# Patient Record
Sex: FEMALE | Race: WHITE | Employment: OTHER
[De-identification: names, ages, dates, MRNs, and addresses within clinical notes are randomized per-mention and may not be internally consistent; named-entity substitution may affect disease eponyms.]

---

## 2017-02-06 ENCOUNTER — HOSPITAL ENCOUNTER (OUTPATIENT)
Dept: PHYSICAL THERAPY | Facility: CLINIC | Age: 82
Setting detail: THERAPIES SERIES
Discharge: HOME | End: 2017-02-06
Payer: MEDICARE

## 2017-02-06 PROCEDURE — 97110 THERAPEUTIC EXERCISES: CPT

## 2017-02-06 PROCEDURE — 97035 APP MDLTY 1+ULTRASOUND EA 15: CPT

## 2017-02-06 PROCEDURE — 97140 MANUAL THERAPY 1/> REGIONS: CPT

## 2017-02-08 ENCOUNTER — HOSPITAL ENCOUNTER (OUTPATIENT)
Dept: PHYSICAL THERAPY | Facility: CLINIC | Age: 82
Setting detail: THERAPIES SERIES
Discharge: HOME | End: 2017-02-08
Payer: MEDICARE

## 2017-02-08 PROCEDURE — 97140 MANUAL THERAPY 1/> REGIONS: CPT

## 2017-02-08 PROCEDURE — 97110 THERAPEUTIC EXERCISES: CPT

## 2017-02-08 PROCEDURE — 97035 APP MDLTY 1+ULTRASOUND EA 15: CPT

## 2017-02-13 ENCOUNTER — HOSPITAL ENCOUNTER (OUTPATIENT)
Dept: PHYSICAL THERAPY | Facility: CLINIC | Age: 82
Setting detail: THERAPIES SERIES
Discharge: HOME | End: 2017-02-13
Payer: MEDICARE

## 2017-02-13 PROCEDURE — 97140 MANUAL THERAPY 1/> REGIONS: CPT

## 2017-02-13 PROCEDURE — 97110 THERAPEUTIC EXERCISES: CPT

## 2017-02-13 PROCEDURE — 97035 APP MDLTY 1+ULTRASOUND EA 15: CPT

## 2017-02-16 ENCOUNTER — HOSPITAL ENCOUNTER (OUTPATIENT)
Dept: PHYSICAL THERAPY | Facility: CLINIC | Age: 82
Setting detail: THERAPIES SERIES
Discharge: HOME | End: 2017-02-16
Payer: MEDICARE

## 2017-02-16 PROCEDURE — 97140 MANUAL THERAPY 1/> REGIONS: CPT

## 2017-02-16 PROCEDURE — 97110 THERAPEUTIC EXERCISES: CPT

## 2017-02-16 PROCEDURE — 97035 APP MDLTY 1+ULTRASOUND EA 15: CPT

## 2017-02-20 ENCOUNTER — HOSPITAL ENCOUNTER (OUTPATIENT)
Dept: PHYSICAL THERAPY | Facility: CLINIC | Age: 82
Setting detail: THERAPIES SERIES
Discharge: HOME | End: 2017-02-20
Payer: MEDICARE

## 2017-02-20 PROCEDURE — 97140 MANUAL THERAPY 1/> REGIONS: CPT

## 2017-02-20 PROCEDURE — 97035 APP MDLTY 1+ULTRASOUND EA 15: CPT

## 2017-02-20 PROCEDURE — 97110 THERAPEUTIC EXERCISES: CPT

## 2017-02-27 ENCOUNTER — HOSPITAL ENCOUNTER (OUTPATIENT)
Dept: PHYSICAL THERAPY | Facility: CLINIC | Age: 82
Setting detail: THERAPIES SERIES
Discharge: HOME | End: 2017-02-27
Payer: MEDICARE

## 2017-02-27 PROCEDURE — 97035 APP MDLTY 1+ULTRASOUND EA 15: CPT

## 2017-02-27 PROCEDURE — 97140 MANUAL THERAPY 1/> REGIONS: CPT

## 2017-02-27 PROCEDURE — 97110 THERAPEUTIC EXERCISES: CPT

## 2017-03-06 ENCOUNTER — HOSPITAL ENCOUNTER (OUTPATIENT)
Dept: PHYSICAL THERAPY | Facility: CLINIC | Age: 82
Setting detail: THERAPIES SERIES
Discharge: HOME | End: 2017-03-06
Payer: MEDICARE

## 2017-03-06 PROCEDURE — 97035 APP MDLTY 1+ULTRASOUND EA 15: CPT

## 2017-03-06 PROCEDURE — 97110 THERAPEUTIC EXERCISES: CPT

## 2017-03-06 PROCEDURE — 97140 MANUAL THERAPY 1/> REGIONS: CPT

## 2017-03-22 ENCOUNTER — HOSPITAL ENCOUNTER (OUTPATIENT)
Dept: PHYSICAL THERAPY | Facility: CLINIC | Age: 82
Setting detail: THERAPIES SERIES
Discharge: HOME | End: 2017-03-22
Payer: MEDICARE

## 2017-03-22 PROCEDURE — 97140 MANUAL THERAPY 1/> REGIONS: CPT

## 2017-03-22 PROCEDURE — 97035 APP MDLTY 1+ULTRASOUND EA 15: CPT

## 2017-03-22 PROCEDURE — 97110 THERAPEUTIC EXERCISES: CPT

## 2017-10-17 ENCOUNTER — HOSPITAL ENCOUNTER (OUTPATIENT)
Dept: PHYSICAL THERAPY | Facility: CLINIC | Age: 82
Setting detail: THERAPIES SERIES
Discharge: HOME | End: 2017-10-17
Payer: MEDICARE

## 2017-10-17 PROCEDURE — 97140 MANUAL THERAPY 1/> REGIONS: CPT

## 2017-10-17 PROCEDURE — G8978 MOBILITY CURRENT STATUS: HCPCS

## 2017-10-17 PROCEDURE — 97035 APP MDLTY 1+ULTRASOUND EA 15: CPT

## 2017-10-17 PROCEDURE — G8979 MOBILITY GOAL STATUS: HCPCS

## 2017-10-17 PROCEDURE — 97161 PT EVAL LOW COMPLEX 20 MIN: CPT

## 2017-10-19 ENCOUNTER — HOSPITAL ENCOUNTER (OUTPATIENT)
Dept: PHYSICAL THERAPY | Facility: CLINIC | Age: 82
Setting detail: THERAPIES SERIES
Discharge: HOME | End: 2017-10-19
Payer: MEDICARE

## 2017-10-19 PROCEDURE — 97140 MANUAL THERAPY 1/> REGIONS: CPT

## 2017-10-19 PROCEDURE — 97110 THERAPEUTIC EXERCISES: CPT

## 2017-10-19 PROCEDURE — 97035 APP MDLTY 1+ULTRASOUND EA 15: CPT

## 2017-10-23 ENCOUNTER — HOSPITAL ENCOUNTER (OUTPATIENT)
Dept: PHYSICAL THERAPY | Facility: CLINIC | Age: 82
Setting detail: THERAPIES SERIES
Discharge: HOME | End: 2017-10-23
Payer: MEDICARE

## 2017-10-23 PROCEDURE — 97140 MANUAL THERAPY 1/> REGIONS: CPT

## 2017-10-23 PROCEDURE — 97110 THERAPEUTIC EXERCISES: CPT

## 2017-10-23 PROCEDURE — 97035 APP MDLTY 1+ULTRASOUND EA 15: CPT

## 2017-10-26 ENCOUNTER — HOSPITAL ENCOUNTER (OUTPATIENT)
Dept: PHYSICAL THERAPY | Facility: CLINIC | Age: 82
Setting detail: THERAPIES SERIES
Discharge: HOME | End: 2017-10-26
Payer: MEDICARE

## 2017-10-26 PROCEDURE — 97140 MANUAL THERAPY 1/> REGIONS: CPT

## 2017-10-26 PROCEDURE — 97110 THERAPEUTIC EXERCISES: CPT

## 2017-10-26 PROCEDURE — 97035 APP MDLTY 1+ULTRASOUND EA 15: CPT

## 2017-11-01 ENCOUNTER — HOSPITAL ENCOUNTER (OUTPATIENT)
Dept: PHYSICAL THERAPY | Facility: CLINIC | Age: 82
Setting detail: THERAPIES SERIES
Discharge: HOME | End: 2017-11-01
Payer: MEDICARE

## 2017-11-01 PROCEDURE — 97140 MANUAL THERAPY 1/> REGIONS: CPT

## 2017-11-01 PROCEDURE — 97035 APP MDLTY 1+ULTRASOUND EA 15: CPT

## 2017-11-01 PROCEDURE — 97110 THERAPEUTIC EXERCISES: CPT

## 2017-11-05 NOTE — FLOWSHEET NOTE
[] Lucero Onofre       Outpatient Physical        Therapy       [address]       Phone: [phone]       Fax: [phone] [] [facility] at [address] [facility]       Phone: [phone]       Fax: [phone] [x] Casie Peres [facility]  [address] [facility]   Phone: [phone]   Fax:  [phone]     Physical Therapy Daily Treatment Note    Date:  10/23/2017  Patient Name:  Rose Duncan    :  1935  MRN: [id number]  Physician: Dr. Anton Sebastian: Markus Diagnosis: Low back pain                 Rehab Codes: M54.5, M99.04  Onset Date: 10/16/17(script)                                  Next 's appt: PRN    Visit# / total visits: 3/12  Cancels/No Shows:     Subjective:    Pain:  [x] Yes  [] No Location: Left lumbar/buttock Pain Rating: (0-10 scale) 5/10  Pain altered Tx:  [x] No  [] Yes  Action:  Comments:Pt states she is feeling worse today. Also states she went to see chiropractor and feels this made her symptoms return.     Objective:  Modalities:  Treatment Location  Left      Right                          Position   lumbar []          []  [x] Supine    [] Prone   [] Side lying  [] Sitting                                            Treatment Modality   1 Hot Pack:    [x] Large   [] Medium    [] Cervical     ___15__ min     Cold Pack   [] Large   [] Medium    [] Cervical     _____ min     Vasocompression    __° temp    ____pressure     _____ min     Electrical Stim:    [] IFC     [] MFAC      [] HVPC                          Protocol:_______  _____X_____min                          #Channels:  [] 1        [] 2       [] Other:   2 Ultrasound: _1.5_____W/cm2 x  ___15___min              [x] 1MHz   [] 3Mhz                      Duty Factor: [x] 100%  [] 50%   [] 20%                  Add: [] Lidex   [] Stim    [] Gel pad        Massage:    [] Soft Tissue   [] Cross Friction                      [] Ice ____min   3 Other:  MET, stretches, ex                             Exercises:  Exercise Reps/ Time Weight/ Level Comments   MET 10 5 sec     Supine PRC ex 20       DKTC stretches 5 10 sec                         Other:    Specific Instructions for next treatment:Recheck pelvic alignment    Treatment Charges: Mins Units   [x]  Modalities: HP 15 0   US 10 1   [x]  Ther Exercise 20 1   [x]  Manual Therapy 10 1   []  Ther Activities     []  Aquatics     []  Vasocompression     []  Other     Total Treatment time 55        Assessment: [x] Progressing toward goals. [] No change. [x] Other:Pt cont with Left post innominate rotation and sacral rotation right. Cont with MET. Cont with sacral mobs with good results after. Cont with ex as previous. Cont with modalities as previous. Will monitor and cont as appropriate. STG: (to be met in 6 treatments)  1. ? Pain: 2/10  2. Normal pelvic alignment  3. ? Strength:Core strength  4. ? Function: all baisc adl's performed without difficulty  5. Independent with Home Exercise Programs  LTG: (to be met in 12 treatments)  1. 0/10 pain  2. Oswestry score improved to 10% disability or less   Patient goals: To relieve pain and prevent reoccurrance     G-CODE  Functional Limitation: Mobility  Functional Assessment Used: Oswestry  Current Status Modifier: CJ  Goal Status Modifier: CI    Pt. Education:  [x] Yes  [] No  [x] Reviewed Prior HEP/Ed  Method of Education: [x] Verbal  [x] Demo  [x] Written  Comprehension of Education:  [x] Verbalizes understanding. [x] Demonstrates understanding. [] Needs review. [] Demonstrates/verbalizes HEP/Ed previously given. Plan: [x] Continue per plan of care.    [] Other:      Time In: 2788            Time Out: [address]    Electronically signed by:  Willian Bhat, PT

## 2017-11-05 NOTE — FLOWSHEET NOTE
Cross Friction                      [] Ice ____min   3 Other:  MET, stretches, ex                             Exercises:  Exercise Reps/ Time Weight/ Level Comments   MET 10 5 sec     Supine PRC ex 20       DKTC stretches 5 10 sec                         Other:    Specific Instructions for next treatment:Recheck pelvic alignment    Treatment Charges: Mins Units   [x]  Modalities: HP 15 0   US 10 1   [x]  Ther Exercise 20 1   [x]  Manual Therapy 10 1   []  Ther Activities     []  Aquatics     []  Vasocompression     []  Other     Total Treatment time 55        Assessment: [x] Progressing toward goals. [] No change. [x] Other:Pt noted with only min Left post innominate rotation and no sacral rotation. Cont with MET and added myofascial work to lumbar area. Cont with ex as previous. Cont with modalities as previous. Will monitor and cont as appropriate. STG: (to be met in 6 treatments)  1. ? Pain: 2/10  2. Normal pelvic alignment  3. ? Strength:Core strength  4. ? Function: all baisc adl's performed without difficulty  5. Independent with Home Exercise Programs  LTG: (to be met in 12 treatments)  1. 0/10 pain  2. Oswestry score improved to 10% disability or less   Patient goals: To relieve pain and prevent reoccurrance     G-CODE  Functional Limitation: Mobility  Functional Assessment Used: Oswestry  Current Status Modifier: CJ  Goal Status Modifier: CI    Pt. Education:  [x] Yes  [] No  [x] Reviewed Prior HEP/Ed  Method of Education: [x] Verbal  [x] Demo  [x] Written  Comprehension of Education:  [x] Verbalizes understanding. [x] Demonstrates understanding. [] Needs review. [] Demonstrates/verbalizes HEP/Ed previously given. Plan: [x] Continue per plan of care.    [] Other:      Time In: 0800            Time Out: 0900    Electronically signed by:  Renan Hopkins, PT

## 2017-11-05 NOTE — FLOWSHEET NOTE
Ez Fall Risk Assessment    Patient Name:  Paula Erickson  : 1935    Risk Factor Scale  Score   History of Falls [] Yes  [x] No 25  0 0   Secondary Diagnosis [] Yes  [x] No 15  0 0   Ambulatory Aid [] Furniture  [] Crutches/cane/walker  [] None/bedrest/wheelchair/nurse 30  15  0 0   IV/Heparin Lock [] Yes  [] No 20  0 0   Gait/Transferring [] Impaired  [] Weak  [x] Normal/bedrest/immobile 20  10  0 0   Mental Status [] Forgets limitations  [x] Oriented to own ability 15  0 0      Total:0     Based on the Assessment score: check the appropriate box.     [x]  No intervention needed   Low =   Score of 0-24    []  Use standard prevention interventions Moderate =  Score of 24-44   [] Give patient handout and discuss fall prevention strategies   [] Establish goal of education for patient/family RE: fall prevention strategies    []  Use high risk prevention interventions High = Score of 45 and higher   [] Give patient handout and discuss fall prevention strategies   [] Establish goal of education for patient/family Re: fall prevention strategies   [] Discuss lifeline / other resources    Electronically signed by:   Carmela Babinski, PT  Date: 10/17/2017

## 2017-11-05 NOTE — CONSULTS
[] Tj Baker        Outpatient Physical                Therapy       [address]       Phone: [phone]       Fax: [phone] [] [facility] at [address] [facility]       Phone: [phone]       Fax: [phone] [x] [address]a. [facility]    [address] [place] [address]     Phone: [phone]     Fax:  [phone]     Physical Therapy General Evaluation    Date:  10/17/2017  Patient: Miguelina Santana  : 1935  MRN: [id number]  Physician: Dr. Damon Block: [facility]  Medical Diagnosis: Low back pain    Rehab Codes: M54.5, M99.04  Onset Date: 10/16/17(script)                                  Next 's appt: PRN    Subjective:   CC: Pt is a 82 yo female who presents with complaints of left low back pain. Pt states she does not recall specific injury but just started hurting and is interfering with daily function and exercise.   HPI: (onset date:10/16/17)    PMHx: [] Unremarkable [] Diabetes [] HTN  [] Pacemaker   [] MI/Heart Problems [] Cancer [x] Arthritis [] Asthma                         [x] refer to full medical chart  In [facility]  [] Other:        Tests: [] X-Ray: [] MRI:  [] Other:    Medications: [x] Refer to full medical record [] None [] Other:  Allergies:      [x] Refer to full medical record [] None [] Other:    Function:  Hand Dominance  [x] Right  [] Left  Working:  [] Normal Duty  [] Light Duty  [] Off D/T Condition  [x] Retired     [] Not Employed  []  Disability  [] Other:            Return to work:   Job/ADL Description:    Pain:  [x] Yes  [] No Location: Left lumbarbuttock Pain Rating: (0-10 scale) 5/10  Pain altered Tx:  [] Yes  [x] No  Action:    Symptoms:  [] Improving [] Worsening [x] Same  Better:  [] AM    [] PM    [] Sit    [] Rise/Sit    []Stand    [] Walk    [] Lying    [] Other:  Worse: [] AM    [] PM    [] Sit    [] Rise/Sit    []Stand    [] Walk    [] Lying    [] Bend                      [] Valsalva    [] Other:  Sleep: [] OK    [] Disturbed    Objective:      ROM  ° A/P STRENGTH  ROM    Left Right Left Right Cervical    Shoulder Flex     Flexion    Abd     Extension    Elbow Flex     Rotation L R   Ext     Sidebend L R   Wrist Flex     Retraction    Ext     Lumbar WFL   Hand      Flexion    Hip Flex     Extension    Ext     Rotation L  R   Abd     Sidebend L R   Knee Flex          Ext          Ankle DF          PF            OBSERVATION No Deficit Deficit Not Tested Comments   Posture       Forward Head [] [] []    Rounded Shoulders [] [] []    Kyphosis [] [] []    Lordosis [] [] []    Lateral Shift [] [] []    Scoliosis [] [] []    Iliac Crest [] [x] [] Pos fwd flex left, pos long leg sit left, right sacral rotaion   PSIS [] [x] []    ASIS [] [x] []    Genu Valgus [] [] []    Genu Varus [] [] []    Genu Recurvatum [] [] []    Pronation [] [] []    Supination [] [] []    Leg Length Discrp [] [] []    Slumped Sitting [] [] []    Palpation [] [x] [] TTP over Left PSIS and increased tension left piriformis musculature. Sensation [] [] []    Edema [] [] []    Neurological [] [] []      FUNCTION Normal Difficult Unable   Sitting [] [] []   Standing [] [] []   Ambulation [] [] []   Groom/Dress [] [] []   Lift/Carry [] [] []   Stairs [] [] []   Bending [] [] []   OH reach [] [] []   Sit to Stand  [] [] []   Comments:    Assessment:Pt noted with Left post innominate rotation and right sacral rotation. Will use modalities PRN and MET, ex and stretches to correct the dysfunction and allow pt to return to normal functional status. Problems:    [x] ? Pain:  [] ? ROM:  [] ? Strength:  [x] ? Function:  [x] Other:Oswestry score: 11 (22%)    STG: (to be met in 6 treatments)  1. ? Pain: 2/10  2. Normal pelvic alignment  3. ? Strength:Core strength  4. ? Function: all [facility] adl's performed without difficulty  5.  Independent with Home Exercise Programs  LTG: (to be met in 12 treatments)  1. 0/10 pain  2. Oswestry score improved to 10% disability or less    Patient goals: To relieve pain and prevent reoccurrance    G-CODE  Functional Limitation: Mobility  Functional Assessment Used: Oswestry  Current Status Modifier: CJ  Goal Status Modifier: CI    Rehab Potential:  [x] Good  [] Fair  [] Poor   Suggested Professional Referral:  [x] No  [] Yes:  Barriers to Goal Achievement[de-identified]  [x] No  [] Yes:  Domestic Concerns:  [x] No  [] Yes:    Pt. Education:  [x] Plans/Goals, Risks/Benefits discussed  [x] Home exercise program  Method of Education: [x] Verbal  [x] Demo  [x] Written( MET self correction)  Comprehension of Education:  [x] Verbalizes understanding. [x] Demonstrates understanding. [] Needs Review. [] Demonstrates/verbalizes understanding of HEP/Ed previously given.     Treatment Plan:  [x] Therapeutic Exercise    [x] Modalities:  [] Therapeutic Activity    [x] Ultrasound  [] Electrical Stimulation  [] Gait Training     [] Massage       [] Lumbar/Cervical Traction  [] Neuromuscular Re-education [x] Cold/hotpack [] Iontophoresis: 4 mg/mL  [x] Instruction in HEP             Dexamethasone Sodium  [x] Manual Therapy             Phosphate 40-80 mAmin  [] Aquatic Therapy    [] Dry Needling [] Other:     Frequency:  2 x/week for 12 visits    Todays Treatment:  Modalities:  Treatment Location  Left      Right                          Position   lumbar []          []  [x] Supine    [] Prone   [] Side lying  [] Sitting          Treatment Modality   1 Hot Pack:    [x] Large   [] Medium    [] Cervical     ___15__ min    Cold Pack   [] Large   [] Medium    [] Cervical     _____ min    Vasocompression    __° temp    ____pressure     _____ min    Electrical Stim:    [] IFC     [] MFAC      [] HVPC                          Protocol:_______  _____X_____min                          #Channels:  [] 1        [] 2       [] Other:   2 Ultrasound: _1.5_____W/cm2 x  ___15___min              [x] 1MHz   [] 3Mhz                      Duty Factor: [x] 100%  [] 50%   [] 20%                  Add: [] Lidex   [] Stim    [] Gel pad       Massage:    [] Soft Tissue   [] Cross Friction                      [] Ice ____min   3 Other:  MET, stretches, ex       Exercises:  Exercise Reps/ Time Weight/ Level Comments   MET 10 5 sec    Supine PRC ex 20     DKTC stretches 5 10 sec                Other:    Specific Instructions for next treatment:Recheck pelvic alignment    Treatment Charges: Mins Units   [x] Evaluation       [x]  Low       []  Moderate       []  High 20 1   [x]  Modalities: US 10 1   [x]  Ther Exercise 5 0   [x]  Manual Therapy 10 1   []  Ther Activities     []  Aquatics     []  Vasocompression     []  Other       TOTAL TREATMENT TIME:45    Time in:1000    Time out:1050    Electronically signed by: Lashawn Chavez PT    Physician Signature:________________________________Date:__________________  By signing above or cosigning this note, I have reviewed this plan of care and certify a need for medically necessary rehabilitation services.      *PLEASE SIGN ABOVE AND FAX BACK ALL PAGES*

## 2017-11-05 NOTE — FLOWSHEET NOTE
[] Marina Sahu       Outpatient Physical        Therapy       [address]       Phone: [phone]       Fax: [phone] [] [facility] at [address] [facility]       Phone: [phone]       Fax: [phone] [x] Casie Arnett [facility]  [address] [facility]   Phone: [phone]   Fax:  [phone]     Physical Therapy Daily Treatment Note    Date:  10/19/2017  Patient Name:  Jessa Rios    :  1935  MRN: [id number]  Physician: Dr. Martinez Granados: Markus Diagnosis: Low back pain                 Rehab Codes: M54.5, M99.04  Onset Date: 10/16/17(script)                                  Next 's appt: PRN    Visit# / total visits: 2/12  Cancels/No Shows:     Subjective:    Pain:  [x] Yes  [] No Location: Left lumbar/buttock Pain Rating: (0-10 scale) 3/10  Pain altered Tx:  [x] No  [] Yes  Action:  Comments:Pt states she can tell a difference after the first treatment that she feels better    Objective:  Modalities:  Treatment Location  Left      Right                          Position   lumbar []          []  [x] Supine    [] Prone   [] Side lying  [] Sitting                                            Treatment Modality   1 Hot Pack:    [x] Large   [] Medium    [] Cervical     ___15__ min     Cold Pack   [] Large   [] Medium    [] Cervical     _____ min     Vasocompression    __° temp    ____pressure     _____ min     Electrical Stim:    [] IFC     [] MFAC      [] HVPC                          Protocol:_______  _____X_____min                          #Channels:  [] 1        [] 2       [] Other:   2 Ultrasound: _1.5_____W/cm2 x  ___15___min              [x] 1MHz   [] 3Mhz                      Duty Factor: [x] 100%  [] 50%   [] 20%                  Add: [] Lidex   [] Stim    [] Gel pad        Massage:    [] Soft Tissue   [] Cross Friction [] Ice ____min   3 Other:  MET, stretches, ex                             Exercises:  Exercise Reps/ Time Weight/ Level Comments   MET 10 5 sec     Supine PRC ex 20       DKTC stretches 5 10 sec                         Other:    Specific Instructions for next treatment:Recheck pelvic alignment    Treatment Charges: Mins Units   [x]  Modalities: HP 15 0   US 10 1   [x]  Ther Exercise 20 1   [x]  Manual Therapy 10 1   []  Ther Activities     []  Aquatics     []  Vasocompression     []  Other     Total Treatment time 55        Assessment: [x] Progressing toward goals. [] No change. [x] Other:Pt cont with Left post innominate rotation. Cont with MET. Added sacral mobs with good results after. Cont with ex as previous. Cont with modalities as previous. Will monitor and cont as appropriate. STG: (to be met in 6 treatments)  1. ? Pain: 2/10  2. Normal pelvic alignment  3. ? Strength:Core strength  4. ? Function: all baisc adl's performed without difficulty  5. Independent with Home Exercise Programs  LTG: (to be met in 12 treatments)  1. 0/10 pain  2. Oswestry score improved to 10% disability or less   Patient goals: To relieve pain and prevent reoccurrance     G-CODE  Functional Limitation: Mobility  Functional Assessment Used: Oswestry  Current Status Modifier: CJ  Goal Status Modifier: CI    Pt. Education:  [x] Yes  [] No  [x] Reviewed Prior HEP/Ed  Method of Education: [x] Verbal  [x] Demo  [x] Written  Comprehension of Education:  [x] Verbalizes understanding. [x] Demonstrates understanding. [] Needs review. [] Demonstrates/verbalizes HEP/Ed previously given. Plan: [x] Continue per plan of care.    [] Other:      Time In: 0830            Time Out: 0930    Electronically signed by:  Naveen Hurt, PT

## 2017-11-07 ENCOUNTER — HOSPITAL ENCOUNTER (OUTPATIENT)
Dept: PHYSICAL THERAPY | Facility: CLINIC | Age: 82
Setting detail: THERAPIES SERIES
Discharge: HOME | End: 2017-11-07
Payer: MEDICARE

## 2017-11-07 PROCEDURE — 97035 APP MDLTY 1+ULTRASOUND EA 15: CPT

## 2017-11-07 PROCEDURE — 97110 THERAPEUTIC EXERCISES: CPT

## 2017-11-07 PROCEDURE — 97140 MANUAL THERAPY 1/> REGIONS: CPT

## 2017-11-15 ENCOUNTER — HOSPITAL ENCOUNTER (OUTPATIENT)
Dept: PHYSICAL THERAPY | Facility: CLINIC | Age: 82
Setting detail: THERAPIES SERIES
Discharge: HOME | End: 2017-11-15
Payer: MEDICARE

## 2017-11-15 PROCEDURE — 97110 THERAPEUTIC EXERCISES: CPT

## 2017-11-15 PROCEDURE — 97140 MANUAL THERAPY 1/> REGIONS: CPT

## 2017-11-15 PROCEDURE — 97035 APP MDLTY 1+ULTRASOUND EA 15: CPT

## 2017-11-15 NOTE — FLOWSHEET NOTE
[] Marina Sahu       Outpatient Physical        Therapy       [address]       Phone: [phone]       Fax: [phone] [] [facility] at [address] [facility]       Phone: [phone]       Fax: [phone] [x] [address]. [address] [place] [address]   Phone: [phone]   Fax:  [phone]     Physical Therapy Daily Treatment Note    Date:  11/15/2017  Patient Name:  Jessa Rios    :  1935  MRN: [id number]  Physician: Dr. Martinez Granados: Markus Diagnosis: Low back pain                 Rehab Codes: M54.5, M99.04  Onset Date: 10/16/17(script)                                  Next 's appt: PRN    Visit# / total visits:   Cancels/No Shows:     Subjective:    Pain:  [x] Yes  [] No Location: Left lumbar/buttock Pain Rating: (0-10 scale) 2/10  Pain altered Tx:  [x] No  [] Yes  Action:  Comments:Pt states she is felling better in her left SIJ/lumbar are but voices concern about her hip and wishes for ex and stretches.       Objective:  Modalities:  Treatment Location  Left      Right                          Position   lumbar []          []  [x] Supine    [] Prone   [] Side lying  [] Sitting                                            Treatment Modality   1 Hot Pack:    [x] Large   [] Medium    [] Cervical     ___15__ min     Cold Pack   [] Large   [] Medium    [] Cervical     _____ min     Vasocompression    __° temp    ____pressure     _____ min     Electrical Stim:    [] IFC     [] MFAC      [] HVPC                          Protocol:_______  _____X_____min                          #Channels:  [] 1        [] 2       [] Other:   2 Ultrasound: _1.5_____W/cm2 x  ___15___min              [x] 1MHz   [] 3Mhz                      Duty Factor: [x] 100%  [] 50%   [] 20%                  Add: [] Lidex   [] Stim    [] Gel pad        Massage:    [] Soft Tissue [] Cross Friction                      [] Ice ____min   3 Other:  MET, stretches, ex                             Exercises:  Exercise Reps/ Time Weight/ Level Comments   MET 10 5 sec     Supine PRC ex 20       DKTC stretches 5 10 sec                         Other:    Specific Instructions for next treatment:Recheck pelvic alignment    Treatment Charges: Mins Units   [x]  Modalities: HP 15 0   US 10 1   [x]  Ther Exercise 20 1   [x]  Manual Therapy 10 1   []  Ther Activities     []  Aquatics     []  Vasocompression     []  Other     Total Treatment time 55        Assessment: [x] Progressing toward goals. [] No change. [x] Other:Pt noted with good alignment with minimal amounts of tissue tension. Cont with MET and myofascial work to lumbar area. Cont with ex as previous. Cont with modalities as previous. Will plan to address left hip strength and mobility next session. Will monitor and cont as appropriate. STG: (to be met in 6 treatments)  1. ? Pain: 2/10  2. Normal pelvic alignment  3. ? Strength:Core strength  4. ? Function: all baisc adl's performed without difficulty  5. Independent with Home Exercise Programs  LTG: (to be met in 12 treatments)  1. 0/10 pain  2. Oswestry score improved to 10% disability or less   Patient goals: To relieve pain and prevent reoccurrance     G-CODE  Functional Limitation: Mobility  Functional Assessment Used: Oswestry  Current Status Modifier: CJ  Goal Status Modifier: CI    Pt. Education:  [x] Yes  [] No  [x] Reviewed Prior HEP/Ed  Method of Education: [x] Verbal  [x] Demo  [x] Written  Comprehension of Education:  [x] Verbalizes understanding. [x] Demonstrates understanding. [] Needs review. [] Demonstrates/verbalizes HEP/Ed previously given. Plan: [x] Continue per plan of care.    [] Other:      Time In: 0800            Time Out: 0900    Electronically signed by:  Anjel Lima, PT

## 2017-11-15 NOTE — PRE-CERTIFICATION NOTE
Medicare Cap   [x] Physical Therapy        [] Speech Therapy          [] Occupational therapy  *PT and Speech caps combine                                                                                  $1980 Cap limit < kx modifier needed < $8344 requires pre-cert         Patient Name: Sy Gallegos  YOB: 1935                Date of [address] Name # units/ charge $$$ charge Daily Total Charge Ongoing Total $$$   1/31/17 EvPrabhakar heath, US, Man   71.81+23.67+21.97+10.01 127.46 127.46   2/3/17 Ex, Man, US   30.78+21.97+10.01 62.76 190.22   2/6/17 Ex, Man, US   30.78+21.97+10.01 62.76 252.98   2/8/17 Ex, Man, US   30.78+21.97+10.01 62.76 315.74   2/13/17 Ex, Man, US   30.78+21.97+10.01 62.76 378.50   2/16/17 Ex, Man, US   30.78+21.97+10.01 62.76 441.26   2/20/17 Ex, Man, US   30.78+21.97+10.01 62.76 504.02   2/27/17 EX, Man, US   30.78+21.97+10.01 62.76 566.78                                             10/17/17  Eval, Man US   77.68+22.18+10.04  109.90  676.68   10/19/17  EX, Man, US    31.19+22.18+10.04  63.41  740.09    10/23/17 EX, Man, US   31.19+22.18+10.04  63.41  803.50    10/26/17  EX, Man, US    31.19+22.18+10.04  63.41  866.91    11/1/17  EX, Man, US    31.19+22.18+10.04  63.41  930.32    11/15/17  EX, Man, US   31.19+22.18+10.04  63.41  993.73

## 2017-11-21 ENCOUNTER — HOSPITAL ENCOUNTER (OUTPATIENT)
Dept: PHYSICAL THERAPY | Facility: CLINIC | Age: 82
Setting detail: THERAPIES SERIES
Discharge: HOME | End: 2017-11-21
Payer: MEDICARE

## 2017-11-21 PROCEDURE — 97035 APP MDLTY 1+ULTRASOUND EA 15: CPT

## 2017-11-21 PROCEDURE — 97110 THERAPEUTIC EXERCISES: CPT

## 2017-11-21 NOTE — FLOWSHEET NOTE
[] Mg Bergman       Outpatient Physical        Therapy       [address]       Phone: [phone]       Fax: [phone] [] [facility] at [address] [facility]       Phone: [phone]       Fax: [phone] [x] [address]   Phone: [phone]   Fax:  [phone]     Physical Therapy Daily Treatment Note    Date:  2017  Patient Name:  Mena Granda    :  1935  MRN: [id number]  Physician: Dr. Jyothi Duran: Markus Diagnosis: Low back pain                 Rehab Codes: M54.5, M99.04  Onset Date: 10/16/17(script)                                  Next 's appt: PRN    Visit# / total visits:   Cancels/No Shows:     Subjective:    Pain:  [x] Yes  [] No Location: Left lumbar/buttock Pain Rating: (0-10 scale) 1-2/10  Pain altered Tx:  [x] No  [] Yes  Action:  Comments:Pt states she is ready to get working on her hip mobility and strength. The back is still a little painful with activity, but feeling better overall.   Objective:  Modalities:  Treatment Location  Left      Right                          Position   lumbar []          []  [x] Supine    [] Prone   [] Side lying  [] Sitting                                            Treatment Modality   1 Hot Pack:    [x] Large   [] Medium    [] Cervical     ___15__ min     Cold Pack   [] Large   [] Medium    [] Cervical     _____ min     Vasocompression    __° temp    ____pressure     _____ min     Electrical Stim:    [] IFC     [] MFAC      [] HVPC                          Protocol:_______  _____X_____min                          #Channels:  [] 1        [] 2       [] Other:   2 Ultrasound: _1.5_____W/cm2 x  ___15___min              [x] 1MHz   [] 3Mhz                      Duty Factor: [x] 100%  [] 50%   [] 20%                  Add: [] Lidex   [] Stim    [] Gel pad        Massage:    [] Soft Tissue   [] Cross Friction                      [] Ice ____min   3 Other:  MET, stretches, ex                             Exercises:  Exercise Reps/ Time Weight/ Level Comments   MET 10 5 sec     Supine PRC ex 20       DKTC stretches 5 10 sec      HS stretch 10 10 sec      seated hip stretch 10 10 sec     bridges 20     3 way clam shell ex 20 Red    4 way hip t-band 20  REd                                  Other:    Specific Instructions for next treatment:Recheck pelvic alignment    Treatment Charges: Mins Units   [x]  Modalities: HP 15 0   US 10 1   [x]  Ther Exercise 30 2   []  Manual Therapy     []  Ther Activities     []  Aquatics     []  Vasocompression     []  Other     Total Treatment time 55        Assessment: [x] Progressing toward goals. [] No change. [x] Other:Pt noted with good alignment with minimal amounts of tissue tension. Cont with MET and myofascial work to lumbar area. Cont with ex as previous. Cont with modalities as previous. Added new hip ex and stretches per above. Provided band and HEP with written instructions. Reviewed HEP. Will monitor and cont as appropriate. STG: (to be met in 6 treatments)  1. ? Pain: 2/10  2. Normal pelvic alignment  3. ? Strength:Core strength  4. ? Function: all baisc adl's performed without difficulty  5. Independent with Home Exercise Programs  LTG: (to be met in 12 treatments)  1. 0/10 pain  2. Oswestry score improved to 10% disability or less   Patient goals: To relieve pain and prevent reoccurrance     G-CODE  Functional Limitation: Mobility  Functional Assessment Used: Oswestry  Current Status Modifier: CJ  Goal Status Modifier: CI    Pt. Education:  [x] Yes  [] No  [x] Reviewed Prior HEP/Ed  Method of Education: [x] Verbal  [x] Demo  [x] Written  Comprehension of Education:  [x] Verbalizes understanding. [x] Demonstrates understanding. [] Needs review. [] Demonstrates/verbalizes HEP/Ed previously given.      Plan: [x] Continue

## 2017-11-21 NOTE — PRE-CERTIFICATION NOTE
Medicare Cap   [x] Physical Therapy        [] Speech Therapy          [] Occupational therapy  *PT and Speech caps combine                                                                                  $1980 Cap limit < kx modifier needed < $5710 requires pre-cert         Patient Name: Doreen Chambers  YOB: 1935                Date of [facility] Name # units/ charge $$$ charge Daily Total Charge Ongoing Total $$$   1/31/17 Eval, EX, US, Man   71.81+23.67+21.97+10.01 127.46 127.46   2/3/17 Ex, Man, US   30.78+21.97+10.01 62.76 190.22   2/6/17 Ex, Man, US   30.78+21.97+10.01 62.76 252.98   2/8/17 Ex, Man, US   30.78+21.97+10.01 62.76 315.74   2/13/17 Ex, Man, US   30.78+21.97+10.01 62.76 378.50   2/16/17 Ex, Man, US   30.78+21.97+10.01 62.76 441.26   2/20/17 Ex, Man, US   30.78+21.97+10.01 62.76 504.02   2/27/17 EX, Man, US   30.78+21.97+10.01 62.76 566.78                                             10/17/17  Eval, Man US   77.68+22.18+10.04  109.90  676.68   10/19/17  EX, Man, US    31.19+22.18+10.04  63.41  740.09    10/23/17 EX, Man, US   31.19+22.18+10.04  63.41  803.50    10/26/17  EX, Man, US    31.19+22.18+10.04  63.41  866.91    11/1/17  EX, Man, US    31.19+22.18+10.04  63.41  930.32    11/15/17  EX, Man, US   31.19+22.18+10.04  63.41  993.73   11/21/17  EX x 2, US   31.19+23.89+10.04  65.12  1058. 85

## 2017-11-29 ENCOUNTER — HOSPITAL ENCOUNTER (OUTPATIENT)
Dept: PHYSICAL THERAPY | Facility: CLINIC | Age: 82
Setting detail: THERAPIES SERIES
Discharge: HOME | End: 2017-11-29
Payer: MEDICARE

## 2017-11-29 PROCEDURE — 97035 APP MDLTY 1+ULTRASOUND EA 15: CPT

## 2017-11-29 PROCEDURE — 97140 MANUAL THERAPY 1/> REGIONS: CPT

## 2017-11-29 PROCEDURE — 97110 THERAPEUTIC EXERCISES: CPT

## 2017-11-29 NOTE — FLOWSHEET NOTE
[] Mg Bergman       Outpatient Physical        Therapy       [address]       Phone: [phone]       Fax: [phone] [] [facility] at [address] [facility]       Phone: [phone]       Fax: [phone] [x] Casie Tristan [facility]  [address] [place] [address]   Phone: [phone]   Fax:  [phone]     Physical Therapy Daily Treatment Note    Date:  2017  Patient Name:  Mena Granda    :  1935  MRN: [id number]  Physician: Dr. Jyothi Duran: Markus Diagnosis: Low back pain                 Rehab Codes: M54.5, M99.04  Onset Date: 10/16/17(script)                                  Next 's appt: PRN    Visit# / total visits:   Cancels/No Shows:     Subjective:    Pain:  [x] Yes  [] No Location: Left lumbar/buttock Pain Rating: (0-10 scale) 1-2/10  Pain altered Tx:  [x] No  [] Yes  Action:  Comments:Pt states that although she is feeling better, she still has some pain right along the left sacrum.    Objective:  Modalities:  Treatment Location  Left      Right                          Position   lumbar []          []  [x] Supine    [] Prone   [] Side lying  [] Sitting                                            Treatment Modality   1 Hot Pack:    [x] Large   [] Medium    [] Cervical     ___15__ min     Cold Pack   [] Large   [] Medium    [] Cervical     _____ min     Vasocompression    __° temp    ____pressure     _____ min     Electrical Stim:    [] IFC     [] MFAC      [] HVPC                          Protocol:_______  _____X_____min                          #Channels:  [] 1        [] 2       [] Other:   2 Ultrasound: _1.5_____W/cm2 x  ___15___min              [x] 1MHz   [] 3Mhz                      Duty Factor: [x] 100%  [] 50%   [] 20%                  Add: [] Lidex   [] Stim    [] Gel pad        Massage:    [] Soft Tissue   [] Cross Friction [] Ice ____min   3 Other:  MET, stretches, ex                             Exercises:  Exercise Reps/ Time Weight/ Level Comments   MET 10 5 sec     Supine PRC ex 20       DKTC stretches 5 10 sec      HS stretch 10 10 sec      seated hip stretch 10 10 sec     bridges 20     3 way clam shell ex 20 Red    4 way hip t-band 20  REd                                  Other:    Specific Instructions for next treatment:Recheck pelvic alignment    Treatment Charges: Mins Units   [x]  Modalities: HP 15 0   US 10 1   [x]  Ther Exercise 20 1   []  Manual Therapy     []  Ther Activities     []  Aquatics     []  Vasocompression     []  Other     Total Treatment time 55        Assessment: [x] Progressing toward goals. [] No change. [x] Other:Pt noted with good alignment with minimal amounts of tissue tension. TTT over lat right sacral border, SIj and mod tension left piriformis. Cont with MET and myofascial work to lumbar area. Cont with ex as previous. Cont with modalities as previous. Added trigger point release with tennis ball. Pt will return in 2 weeks as she is having a dermatology procedure next week and unable to come to PT. Reviewed HEP. Will monitor and cont as appropriate. STG: (to be met in 6 treatments)  1. ? Pain: 2/10-MET  2. Normal pelvic alignment-MET  3. ? Strength:Core strength  4. ? Function: all baisc adl's performed without difficulty  5. Independent with Home Exercise Programs-MET  LTG: (to be met in 12 treatments)  1. 0/10 pain  2. Oswestry score improved to 10% disability or less   Patient goals: To relieve pain and prevent reoccurrance     G-CODE  Functional Limitation: Mobility  Functional Assessment Used: Oswestry  Current Status Modifier: CJ  Goal Status Modifier: CI    Pt. Education:  [x] Yes  [] No  [x] Reviewed Prior HEP/Ed  Method of Education: [x] Verbal  [x] Demo  [x] Written  Comprehension of Education:  [x] Verbalizes understanding. [x] Demonstrates understanding.   []

## 2017-11-29 NOTE — PRE-CERTIFICATION NOTE
Medicare Cap   [x] Physical Therapy        [] Speech Therapy          [] Occupational therapy  *PT and Speech caps combine                                                                                  $1980 Cap limit < kx modifier needed < $7988 requires pre-cert         Patient Name: Kristin Diaz  YOB: 1935                Date [facility] Name # units/ charge $$$ charge Daily Total Charge Ongoing Total $$$   1/31/17 Eval, EX, US, Man   71.81+23.67+21.97+10.01 127.46 127.46   2/3/17 Ex, Man, US   30.78+21.97+10.01 62.76 190.22   2/6/17 Ex, Man, US   30.78+21.97+10.01 62.76 252.98   2/8/17 Ex, Man, US   30.78+21.97+10.01 62.76 315.74   2/13/17 Ex, Man, US   30.78+21.97+10.01 62.76 378.50   2/16/17 Ex, Man, US   30.78+21.97+10.01 62.76 441.26   2/20/17 Ex, Man, US   30.78+21.97+10.01 62.76 504.02   2/27/17 EX, Man, US   30.78+21.97+10.01 62.76 566.78                                             10/17/17  Eval, Man US   77.68+22.18+10.04  109.90  676.68   10/19/17  EX, Man, US    31.19+22.18+10.04  63.41  740.09    10/23/17 EX, Man, US   31.19+22.18+10.04  63.41  803.50    10/26/17  EX, Man, US    31.19+22.18+10.04  63.41  866.91    11/1/17  EX, Man, US    31.19+22.18+10.04  63.41  930.32    11/15/17  EX, Man, US   31.19+22.18+10.04  63.41  993.73   11/21/17  EX x 2, US   31.19+23.89+10.04  65.12  1058. 80    11/29/17  EX  Man , US   31.19+22.18+10.04  63.41  1521.48

## 2017-12-12 ENCOUNTER — HOSPITAL ENCOUNTER (OUTPATIENT)
Dept: PHYSICAL THERAPY | Facility: CLINIC | Age: 82
Setting detail: THERAPIES SERIES
Discharge: HOME | End: 2017-12-12
Payer: MEDICARE

## 2017-12-12 PROCEDURE — 97110 THERAPEUTIC EXERCISES: CPT

## 2017-12-12 PROCEDURE — 97035 APP MDLTY 1+ULTRASOUND EA 15: CPT

## 2017-12-12 PROCEDURE — 97140 MANUAL THERAPY 1/> REGIONS: CPT

## 2017-12-19 ENCOUNTER — HOSPITAL ENCOUNTER (OUTPATIENT)
Dept: PHYSICAL THERAPY | Facility: CLINIC | Age: 82
Setting detail: THERAPIES SERIES
Discharge: HOME | End: 2017-12-19
Payer: MEDICARE

## 2017-12-19 PROCEDURE — 97110 THERAPEUTIC EXERCISES: CPT

## 2017-12-19 PROCEDURE — 97140 MANUAL THERAPY 1/> REGIONS: CPT

## 2017-12-19 PROCEDURE — 97035 APP MDLTY 1+ULTRASOUND EA 15: CPT

## 2018-01-12 ENCOUNTER — APPOINTMENT (OUTPATIENT)
Dept: PHYSICAL THERAPY | Facility: CLINIC | Age: 83
End: 2018-01-12
Payer: MEDICARE

## 2018-01-17 ENCOUNTER — HOSPITAL ENCOUNTER (OUTPATIENT)
Dept: PHYSICAL THERAPY | Facility: CLINIC | Age: 83
Setting detail: THERAPIES SERIES
Discharge: HOME | End: 2018-01-17
Payer: MEDICARE

## 2018-01-17 PROCEDURE — 97140 MANUAL THERAPY 1/> REGIONS: CPT

## 2018-01-17 PROCEDURE — 97110 THERAPEUTIC EXERCISES: CPT

## 2018-01-17 PROCEDURE — 97035 APP MDLTY 1+ULTRASOUND EA 15: CPT

## 2018-01-23 ENCOUNTER — APPOINTMENT (OUTPATIENT)
Dept: PHYSICAL THERAPY | Facility: CLINIC | Age: 83
End: 2018-01-23
Payer: MEDICARE

## 2018-01-30 ENCOUNTER — HOSPITAL ENCOUNTER (OUTPATIENT)
Dept: PHYSICAL THERAPY | Facility: CLINIC | Age: 83
Setting detail: THERAPIES SERIES
Discharge: HOME | End: 2018-01-30
Payer: MEDICARE

## 2018-01-30 PROCEDURE — 97140 MANUAL THERAPY 1/> REGIONS: CPT

## 2018-01-30 PROCEDURE — 97035 APP MDLTY 1+ULTRASOUND EA 15: CPT

## 2018-01-30 PROCEDURE — 97110 THERAPEUTIC EXERCISES: CPT

## 2018-02-13 ENCOUNTER — HOSPITAL ENCOUNTER (OUTPATIENT)
Dept: PHYSICAL THERAPY | Facility: CLINIC | Age: 83
Setting detail: THERAPIES SERIES
Discharge: HOME | End: 2018-02-13
Payer: MEDICARE

## 2018-02-13 PROCEDURE — 97110 THERAPEUTIC EXERCISES: CPT

## 2018-02-13 PROCEDURE — 97035 APP MDLTY 1+ULTRASOUND EA 15: CPT

## 2018-02-20 ENCOUNTER — HOSPITAL ENCOUNTER (OUTPATIENT)
Dept: PHYSICAL THERAPY | Facility: CLINIC | Age: 83
Setting detail: THERAPIES SERIES
Discharge: HOME | End: 2018-02-20
Payer: MEDICARE

## 2018-02-20 PROCEDURE — G8979 MOBILITY GOAL STATUS: HCPCS

## 2018-02-20 PROCEDURE — 97110 THERAPEUTIC EXERCISES: CPT

## 2018-02-20 PROCEDURE — 97140 MANUAL THERAPY 1/> REGIONS: CPT

## 2018-02-20 PROCEDURE — G8978 MOBILITY CURRENT STATUS: HCPCS

## 2018-02-20 NOTE — PRE-CERTIFICATION NOTE
Medicare Cap   [x] Physical Therapy        [] Speech Therapy          [] Occupational therapy  *PT and Speech caps combine                                                                                  $1980 Cap limit < kx modifier needed < $3898 requires pre-cert         Patient Name: Dash Moses  YOB: 1935                Date of [facility] [address] Name # units/ charge $$$ charge Daily Total Charge Ongoing Total $$$   1/31/17 Eval, EX, US, Man   71.81+23.67+21.97+10.01 127.46 127.46   2/3/17 Ex, Man, US   30.78+21.97+10.01 62.76 190.22   2/6/17 Ex, Man, US   30.78+21.97+10.01 62.76 252.98   2/8/17 Ex, Man, US   30.78+21.97+10.01 62.76 315.74   2/13/17 Ex, Man, US   30.78+21.97+10.01 62.76 378.50   2/16/17 Ex, Man, US   30.78+21.97+10.01 62.76 441.26   2/20/17 Ex, Man, US   30.78+21.97+10.01 62.76 504.02   2/27/17 EX, Man, US   30.78+21.97+10.01 62.76 566.78                                             10/17/17  Eval, Man US   77.68+22.18+10.04  109.90  676.68   10/19/17  EX, Man, US    31.19+22.18+10.04  63.41  740.09    10/23/17 EX, Man, US   31.19+22.18+10.04  63.41  803.50    10/26/17  EX, Man, US    31.19+22.18+10.04  63.41  866.91    11/1/17  EX, Man, US    31.19+22.18+10.04  63.41  930.32    11/15/17  EX, Man, US   31.19+22.18+10.04  63.41  993.73   11/21/17  EX x 2, US   31.19+23.89+10.04  65.12  1058. 80    11/29/17  EX  Man , US   31.19+22.18+10.04  63.41  1122.26    12/12/17  Ex, Man, US    31.19+22.18+10.04  63.41  1185.67    12/19/17  EX, Man, US    31.19+22.18+10.04  63.41  1249.08                                1/17/18  EX, Man, US    29.49+21.12+10.32  60.93  60.93    1/30/18  Ex, Man, US    29.49+21.12+10.32  60.93  121.86    2/13/18 Jaun Fiddler    55.55+45.27  39.81  161.67    2/20/18  EX, Man    66.01+73.11  50.61  212.28

## 2018-05-31 ENCOUNTER — HOSPITAL ENCOUNTER (OUTPATIENT)
Age: 83
Setting detail: SPECIMEN
Discharge: HOME | End: 2018-05-31
Payer: MEDICARE

## 2018-06-04 LAB — DERMATOLOGY PATHOLOGY REPORT: NORMAL

## 2018-08-10 ENCOUNTER — HOSPITAL ENCOUNTER (OUTPATIENT)
Dept: PHYSICAL THERAPY | Facility: CLINIC | Age: 83
Setting detail: THERAPIES SERIES
Discharge: HOME | End: 2018-08-10
Payer: MEDICARE

## 2018-08-10 PROCEDURE — 97035 APP MDLTY 1+ULTRASOUND EA 15: CPT

## 2018-08-10 PROCEDURE — G8978 MOBILITY CURRENT STATUS: HCPCS

## 2018-08-10 PROCEDURE — G8979 MOBILITY GOAL STATUS: HCPCS

## 2018-08-10 PROCEDURE — 97161 PT EVAL LOW COMPLEX 20 MIN: CPT

## 2018-08-10 PROCEDURE — 97110 THERAPEUTIC EXERCISES: CPT

## 2018-08-10 PROCEDURE — G0283 ELEC STIM OTHER THAN WOUND: HCPCS

## 2018-08-16 ENCOUNTER — HOSPITAL ENCOUNTER (OUTPATIENT)
Dept: PHYSICAL THERAPY | Facility: CLINIC | Age: 83
Setting detail: THERAPIES SERIES
Discharge: HOME | End: 2018-08-16
Payer: MEDICARE

## 2018-08-16 PROCEDURE — G0283 ELEC STIM OTHER THAN WOUND: HCPCS

## 2018-08-16 PROCEDURE — 97530 THERAPEUTIC ACTIVITIES: CPT

## 2018-08-16 PROCEDURE — 97035 APP MDLTY 1+ULTRASOUND EA 15: CPT

## 2018-08-19 NOTE — PRE-CERTIFICATION NOTE
Medicare Cap   [x] Physical Therapy  [] Speech Therapy  [] Occupational therapy  *PT and Speech caps combine      $2010 Cap limit < kx modifier needed < $3831 requires pre-cert        Patient Name: Refugio Palomares  YOB: 1935    Note:  This is an estimate of charges billed.      Date of [address] Name # units/ charge $$$ charge Daily Total Charge Ongoing Total $$$   [employer] 2018 xxxxxxxxxxxxxxxxx xxxxxxxxx xxxxxxxxxxxxxxxxxxxxxxxxxxxx xxxxxxxxxx 212.28   8/10/18 JEF Anderson, US, EX  80.57+10.40+10.32+22.99 124.28 336.56

## 2018-08-19 NOTE — CONSULTS
4/10  Pain altered Tx:  [] Yes  [x] No  Action:     Symptoms:  [] Improving         [] Worsening      [x] Same  Better:  [] AM    [] PM    [] Sit    [] Rise/Sit    []Stand    [] Walk    [] Lying    [] Other:  Worse: [] AM    [] PM    [] Sit    [] Rise/Sit    []Stand    [] Walk    [] Lying    [] Bend                      [] Valsalva    [] Other:  Sleep: [] OK    [] Disturbed     Objective:                 ROM  ° A/P STRENGTH   ROM     Left Right Left Right Cervical     Shoulder Flex         Flexion     Abd         Extension     Elbow Flex         Rotation L R   Ext         Sidebend L R   Wrist Flex         Retraction     Ext         Lumbar WFL   Hand          Flexion     Hip Flex      4  4 Extension     Ext      4  4 Rotation L  R   Abd      4  4 Sidebend L R   Knee Flex      4  4         Ext      4  4         Ankle DF      5  5         PF      5  5            OBSERVATION No Deficit Deficit Not Tested Comments   Posture           Forward Head [] [] []     Rounded Shoulders [] [] []     Kyphosis [] [] []     Lordosis [] [] []     Lateral Shift [] [x] [] To right    Scoliosis [] [x] []     Iliac Crest [] [x] [] Pos fwd flex left, pos long leg sit left, right sacral rotaion   PSIS [] [x] []     ASIS [] [x] []     Genu Valgus [] [] []     Genu Varus [] [] []     Genu Recurvatum [] [] []     Pronation [] [] []     Supination [] [] []     Leg Length Discrp [] [x] [] LLE>1/2\"    Slumped Sitting [] [] []     Palpation [] [x] [] TTP over Left PSIS and increased tension left piriformis musculature. Sensation [] [] []     Edema [] [] []     Neurological [] [] []     Comments: Neg SLR, neg camila, neg KTC     Assessment:Pt noted with Left post innominate rotation and right sacral rotation as well as lumbar shift to right and leg length discrepancy. Will use modalities PRN and MET, ex and stretches to correct the dysfunction and allow pt to return to normal functional status. Problems:    [x] ? Pain:  [] ? ROM:  [] ? Strength:  [x] ? Function:  [x] Other:LEFS score: 63     STG: (to be met in 6 treatments)  1. ? Pain: 2/10  2. Normal pelvic alignment  3. ? Strength:Core strength  4. ? Function: all baisc adl's performed without difficulty  5. Independent with Home Exercise Programs  6. Heel lift for leg length correction  LTG: (to be met in 12 treatments)  1. 0/10 pain  2. LEFS score improved 10 points  3. LE strength grossly 5/5     Patient goals: Develop a plan to alleviate the pain and strain through appropriate exercises, etc. Avoid Surgery!     G-CODE  Functional Limitation: Mobility  Functional Assessment Used: LEFS  Current Status Modifier: CJ  Goal Status Modifier: CI     Rehab Potential:  [x] Good  [] Fair  [] Poor              Suggested Professional Referral:  [x] No  [] Yes:  Barriers to Goal Achievement[de-identified]  [x] No  [] Yes:  Domestic Concerns:  [x] No  [] Yes:     Pt. Education:  [x] Plans/Goals, Risks/Benefits discussed  [x] Home exercise program  Method of Education: [x] Verbal  [x] Demo  [x] Written( MET self correction, piriformis stretches)  Comprehension of Education:  [x] Verbalizes understanding. [x] Demonstrates understanding. [] Needs Review.   [] Demonstrates/verbalizes understanding of HEP/Ed previously given.     Treatment Plan:  [x] Therapeutic Exercise             [x] Modalities:  [] Therapeutic Activity               [x] Ultrasound                  [x] Electrical Stimulation  [] Gait Training                          [] Massage        [] Lumbar/Cervical Traction  [] Neuromuscular Re-education            [x] Cold/hotpack  [] Iontophoresis: 4 mg/mL  [x] Instruction in HEP                                                                       Dexamethasone Sodium  [x] Manual Therapy                                                                          Phosphate 40-80 mAmin  [] Aquatic Therapy                                 [] Dry Needling  [] Other:      Frequency:  2 x/week for 12 visits     Todays Treatment:  Modalities:  Treatment Location  Left      Right                          Position   lumbar []          []  [x] Supine    [] Prone   [] Side lying  [] Sitting                                            Treatment Modality   1 Hot Pack:    [x] Large   [] Medium    [x] Cervical     ___20__ min     Cold Pack   [] Large   [] Medium    [] Cervical     _____ min     Vasocompression    __° temp    ____pressure     _____ min    1 Electrical Stim:    [] IFC     [] MFAC      [x] HVPC                          Protocol:___pain_____X___20__min                          #Channels:  [] 1        [x] 2       [] Other:   2 Ultrasound: _1.5_____W/cm2 x  ___15___min              [x] 1MHz   [] 3Mhz                      Duty Factor: [x] 100%  [] 50%   [] 20%                  Add: [] Lidex   [] Stim    [] Gel pad   Left piriformis     Massage:    [] Soft Tissue   [] Cross Friction                      [] Ice ____min   3 Other:  MET, stretches, ex                             Exercises:  Exercise Reps/ Time Weight/ Level Comments   MET 10 5 sec     Supine PRC ex 20       Piriformis stretches 5 10 sec  w/towel asst                       Other:     Specific Instructions for next treatment:Recheck pelvic alignment, trial of heel lift     Treatment Charges: Mins Units   [x] Evaluation       [x]  Low       []  Moderate       []  High 20 1   [x]  Modalities: HP/ES 20/20 0/1   US 10 1   [x]  Ther Exercise 15 1   []  Manual Therapy     []  Ther Activities       []  Aquatics       []  Vasocompression       []  Other          TOTAL TREATMENT TIME:65     Time in:0830    Time RIU:3008     Electronically signed by: Hollie Homans, PT     Physician Signature:________________________________Date:__________________  By signing above or cosigning this note, I have reviewed this plan of care and certify a need for medically necessary rehabilitation services.      *PLEASE SIGN ABOVE AND FAX BACK ALL PAGES*

## 2018-08-21 ENCOUNTER — HOSPITAL ENCOUNTER (OUTPATIENT)
Dept: PHYSICAL THERAPY | Facility: CLINIC | Age: 83
Setting detail: THERAPIES SERIES
Discharge: HOME | End: 2018-08-21
Payer: MEDICARE

## 2018-08-21 PROCEDURE — 97110 THERAPEUTIC EXERCISES: CPT

## 2018-08-21 PROCEDURE — G0283 ELEC STIM OTHER THAN WOUND: HCPCS

## 2018-08-21 PROCEDURE — 97035 APP MDLTY 1+ULTRASOUND EA 15: CPT

## 2018-08-23 ENCOUNTER — HOSPITAL ENCOUNTER (OUTPATIENT)
Dept: PHYSICAL THERAPY | Facility: CLINIC | Age: 83
Setting detail: THERAPIES SERIES
Discharge: HOME | End: 2018-08-23
Payer: MEDICARE

## 2018-08-23 PROCEDURE — G0283 ELEC STIM OTHER THAN WOUND: HCPCS

## 2018-08-23 PROCEDURE — 97110 THERAPEUTIC EXERCISES: CPT

## 2018-08-23 PROCEDURE — 97035 APP MDLTY 1+ULTRASOUND EA 15: CPT

## 2018-08-30 ENCOUNTER — HOSPITAL ENCOUNTER (OUTPATIENT)
Dept: PHYSICAL THERAPY | Facility: CLINIC | Age: 83
Setting detail: THERAPIES SERIES
Discharge: HOME | End: 2018-08-30
Payer: MEDICARE

## 2018-08-30 PROCEDURE — G0283 ELEC STIM OTHER THAN WOUND: HCPCS

## 2018-08-30 PROCEDURE — 97035 APP MDLTY 1+ULTRASOUND EA 15: CPT

## 2018-08-30 PROCEDURE — 97110 THERAPEUTIC EXERCISES: CPT

## 2018-10-23 ENCOUNTER — HOSPITAL ENCOUNTER (OUTPATIENT)
Dept: PHYSICAL THERAPY | Facility: CLINIC | Age: 83
Setting detail: THERAPIES SERIES
Discharge: HOME | End: 2018-10-23
Payer: MEDICARE

## 2018-10-25 ENCOUNTER — HOSPITAL ENCOUNTER (OUTPATIENT)
Dept: PHYSICAL THERAPY | Facility: CLINIC | Age: 83
Setting detail: THERAPIES SERIES
Discharge: HOME | End: 2018-10-25
Payer: MEDICARE

## 2018-10-25 PROCEDURE — 97012 MECHANICAL TRACTION THERAPY: CPT

## 2018-10-25 PROCEDURE — 97110 THERAPEUTIC EXERCISES: CPT

## 2018-10-25 PROCEDURE — G0283 ELEC STIM OTHER THAN WOUND: HCPCS

## 2018-10-30 ENCOUNTER — HOSPITAL ENCOUNTER (OUTPATIENT)
Dept: PHYSICAL THERAPY | Facility: CLINIC | Age: 83
Setting detail: THERAPIES SERIES
Discharge: HOME | End: 2018-10-30
Payer: MEDICARE

## 2018-10-30 PROCEDURE — 97110 THERAPEUTIC EXERCISES: CPT

## 2018-10-30 PROCEDURE — 97012 MECHANICAL TRACTION THERAPY: CPT

## 2018-10-30 PROCEDURE — G0283 ELEC STIM OTHER THAN WOUND: HCPCS

## 2018-11-02 ENCOUNTER — HOSPITAL ENCOUNTER (OUTPATIENT)
Dept: PHYSICAL THERAPY | Facility: CLINIC | Age: 83
Setting detail: THERAPIES SERIES
Discharge: HOME | End: 2018-11-02
Payer: MEDICARE

## 2018-11-02 PROCEDURE — 97110 THERAPEUTIC EXERCISES: CPT

## 2018-11-02 PROCEDURE — 97113 AQUATIC THERAPY/EXERCISES: CPT

## 2018-11-02 PROCEDURE — G0283 ELEC STIM OTHER THAN WOUND: HCPCS

## 2018-11-05 ENCOUNTER — APPOINTMENT (OUTPATIENT)
Dept: PHYSICAL THERAPY | Facility: CLINIC | Age: 83
End: 2018-11-05
Payer: MEDICARE

## 2018-11-05 ENCOUNTER — HOSPITAL ENCOUNTER (OUTPATIENT)
Dept: PHYSICAL THERAPY | Facility: CLINIC | Age: 83
Setting detail: THERAPIES SERIES
Discharge: HOME | End: 2018-11-05
Payer: MEDICARE

## 2018-11-05 PROCEDURE — 97110 THERAPEUTIC EXERCISES: CPT

## 2018-11-05 PROCEDURE — G0283 ELEC STIM OTHER THAN WOUND: HCPCS

## 2018-11-08 ENCOUNTER — APPOINTMENT (OUTPATIENT)
Dept: PHYSICAL THERAPY | Facility: CLINIC | Age: 83
End: 2018-11-08
Payer: MEDICARE

## 2018-11-09 ENCOUNTER — APPOINTMENT (OUTPATIENT)
Dept: PHYSICAL THERAPY | Facility: CLINIC | Age: 83
End: 2018-11-09
Payer: MEDICARE

## 2018-11-09 ENCOUNTER — HOSPITAL ENCOUNTER (OUTPATIENT)
Dept: PHYSICAL THERAPY | Facility: CLINIC | Age: 83
Setting detail: THERAPIES SERIES
Discharge: HOME | End: 2018-11-09
Payer: MEDICARE

## 2018-11-09 PROCEDURE — 97110 THERAPEUTIC EXERCISES: CPT

## 2018-11-09 PROCEDURE — G0283 ELEC STIM OTHER THAN WOUND: HCPCS

## 2018-12-06 ENCOUNTER — HOSPITAL ENCOUNTER (OUTPATIENT)
Dept: PHYSICAL THERAPY | Facility: CLINIC | Age: 83
Setting detail: THERAPIES SERIES
Discharge: HOME | End: 2018-12-06
Payer: MEDICARE

## 2018-12-06 PROCEDURE — 97110 THERAPEUTIC EXERCISES: CPT

## 2018-12-06 PROCEDURE — G0283 ELEC STIM OTHER THAN WOUND: HCPCS

## 2018-12-06 PROCEDURE — 97035 APP MDLTY 1+ULTRASOUND EA 15: CPT

## 2018-12-12 ENCOUNTER — HOSPITAL ENCOUNTER (OUTPATIENT)
Dept: PHYSICAL THERAPY | Facility: CLINIC | Age: 83
Setting detail: THERAPIES SERIES
Discharge: HOME | End: 2018-12-12
Payer: MEDICARE

## 2018-12-12 PROCEDURE — 97110 THERAPEUTIC EXERCISES: CPT

## 2018-12-12 PROCEDURE — G0283 ELEC STIM OTHER THAN WOUND: HCPCS

## 2018-12-12 PROCEDURE — 97035 APP MDLTY 1+ULTRASOUND EA 15: CPT

## 2018-12-17 ENCOUNTER — HOSPITAL ENCOUNTER (OUTPATIENT)
Dept: PHYSICAL THERAPY | Facility: CLINIC | Age: 83
Setting detail: THERAPIES SERIES
Discharge: HOME | End: 2018-12-17
Payer: MEDICARE

## 2018-12-17 PROCEDURE — G0283 ELEC STIM OTHER THAN WOUND: HCPCS

## 2018-12-17 PROCEDURE — 97035 APP MDLTY 1+ULTRASOUND EA 15: CPT

## 2018-12-17 PROCEDURE — 97110 THERAPEUTIC EXERCISES: CPT

## 2018-12-20 ENCOUNTER — HOSPITAL ENCOUNTER (OUTPATIENT)
Dept: PHYSICAL THERAPY | Facility: CLINIC | Age: 83
Setting detail: THERAPIES SERIES
Discharge: HOME | End: 2018-12-20
Payer: MEDICARE

## 2018-12-24 ENCOUNTER — HOSPITAL ENCOUNTER (OUTPATIENT)
Dept: PHYSICAL THERAPY | Facility: CLINIC | Age: 83
Setting detail: THERAPIES SERIES
Discharge: HOME | End: 2018-12-24
Payer: MEDICARE

## 2018-12-24 PROCEDURE — 97035 APP MDLTY 1+ULTRASOUND EA 15: CPT

## 2018-12-24 PROCEDURE — 97110 THERAPEUTIC EXERCISES: CPT

## 2018-12-24 PROCEDURE — G0283 ELEC STIM OTHER THAN WOUND: HCPCS

## 2019-09-16 ENCOUNTER — HOSPITAL ENCOUNTER (OUTPATIENT)
Dept: PHYSICAL THERAPY | Facility: CLINIC | Age: 84
Setting detail: THERAPIES SERIES
Discharge: HOME | End: 2019-09-16
Payer: MEDICARE

## 2019-09-16 PROCEDURE — 97035 APP MDLTY 1+ULTRASOUND EA 15: CPT

## 2019-09-16 PROCEDURE — 97161 PT EVAL LOW COMPLEX 20 MIN: CPT

## 2019-09-16 PROCEDURE — 97110 THERAPEUTIC EXERCISES: CPT

## 2019-09-19 ENCOUNTER — HOSPITAL ENCOUNTER (OUTPATIENT)
Dept: PHYSICAL THERAPY | Facility: CLINIC | Age: 84
Setting detail: THERAPIES SERIES
Discharge: HOME | End: 2019-09-19
Payer: MEDICARE

## 2019-09-19 PROCEDURE — G0283 ELEC STIM OTHER THAN WOUND: HCPCS

## 2019-09-19 PROCEDURE — 97035 APP MDLTY 1+ULTRASOUND EA 15: CPT

## 2019-09-19 PROCEDURE — 97140 MANUAL THERAPY 1/> REGIONS: CPT

## 2019-09-24 ENCOUNTER — HOSPITAL ENCOUNTER (OUTPATIENT)
Dept: PHYSICAL THERAPY | Facility: CLINIC | Age: 84
Setting detail: THERAPIES SERIES
Discharge: HOME | End: 2019-09-24
Payer: MEDICARE

## 2019-09-24 PROCEDURE — 97110 THERAPEUTIC EXERCISES: CPT

## 2019-09-24 PROCEDURE — G0283 ELEC STIM OTHER THAN WOUND: HCPCS

## 2019-09-24 PROCEDURE — 97140 MANUAL THERAPY 1/> REGIONS: CPT

## 2019-09-27 ENCOUNTER — HOSPITAL ENCOUNTER (OUTPATIENT)
Dept: PHYSICAL THERAPY | Facility: CLINIC | Age: 84
Setting detail: THERAPIES SERIES
Discharge: HOME | End: 2019-09-27
Payer: MEDICARE

## 2019-09-27 PROCEDURE — 97140 MANUAL THERAPY 1/> REGIONS: CPT

## 2019-09-27 PROCEDURE — G0283 ELEC STIM OTHER THAN WOUND: HCPCS

## 2019-09-30 ENCOUNTER — HOSPITAL ENCOUNTER (OUTPATIENT)
Dept: PHYSICAL THERAPY | Facility: CLINIC | Age: 84
Setting detail: THERAPIES SERIES
Discharge: HOME | End: 2019-09-30
Payer: MEDICARE

## 2019-09-30 PROCEDURE — G0283 ELEC STIM OTHER THAN WOUND: HCPCS

## 2019-09-30 PROCEDURE — 97140 MANUAL THERAPY 1/> REGIONS: CPT

## 2019-10-03 ENCOUNTER — HOSPITAL ENCOUNTER (OUTPATIENT)
Dept: PHYSICAL THERAPY | Facility: CLINIC | Age: 84
Setting detail: THERAPIES SERIES
Discharge: HOME | End: 2019-10-03
Payer: MEDICARE

## 2019-10-03 PROCEDURE — G0283 ELEC STIM OTHER THAN WOUND: HCPCS

## 2019-10-03 PROCEDURE — 97140 MANUAL THERAPY 1/> REGIONS: CPT

## 2019-10-07 ENCOUNTER — HOSPITAL ENCOUNTER (OUTPATIENT)
Dept: PHYSICAL THERAPY | Facility: CLINIC | Age: 84
Setting detail: THERAPIES SERIES
Discharge: HOME | End: 2019-10-07
Payer: MEDICARE

## 2019-10-07 PROCEDURE — G0283 ELEC STIM OTHER THAN WOUND: HCPCS

## 2019-10-07 PROCEDURE — 97140 MANUAL THERAPY 1/> REGIONS: CPT

## 2019-10-11 ENCOUNTER — HOSPITAL ENCOUNTER (OUTPATIENT)
Dept: PHYSICAL THERAPY | Facility: CLINIC | Age: 84
Setting detail: THERAPIES SERIES
Discharge: HOME | End: 2019-10-11
Payer: MEDICARE

## 2019-10-11 PROCEDURE — 97140 MANUAL THERAPY 1/> REGIONS: CPT

## 2019-10-11 PROCEDURE — G0283 ELEC STIM OTHER THAN WOUND: HCPCS

## 2019-10-14 ENCOUNTER — HOSPITAL ENCOUNTER (OUTPATIENT)
Dept: PHYSICAL THERAPY | Facility: CLINIC | Age: 84
Setting detail: THERAPIES SERIES
Discharge: HOME | End: 2019-10-14
Payer: MEDICARE

## 2019-10-14 PROCEDURE — 97140 MANUAL THERAPY 1/> REGIONS: CPT

## 2019-10-14 PROCEDURE — G0283 ELEC STIM OTHER THAN WOUND: HCPCS

## 2019-10-17 ENCOUNTER — HOSPITAL ENCOUNTER (OUTPATIENT)
Dept: PHYSICAL THERAPY | Facility: CLINIC | Age: 84
Setting detail: THERAPIES SERIES
Discharge: HOME | End: 2019-10-17
Payer: MEDICARE

## 2019-10-17 PROCEDURE — 97140 MANUAL THERAPY 1/> REGIONS: CPT

## 2019-10-17 PROCEDURE — 97035 APP MDLTY 1+ULTRASOUND EA 15: CPT

## 2019-10-17 PROCEDURE — 97032 APPL MODALITY 1+ESTIM EA 15: CPT

## 2019-10-22 ENCOUNTER — HOSPITAL ENCOUNTER (OUTPATIENT)
Dept: PHYSICAL THERAPY | Facility: CLINIC | Age: 84
Setting detail: THERAPIES SERIES
Discharge: HOME | End: 2019-10-22
Payer: MEDICARE

## 2019-10-22 PROCEDURE — 97035 APP MDLTY 1+ULTRASOUND EA 15: CPT

## 2019-10-22 PROCEDURE — G0283 ELEC STIM OTHER THAN WOUND: HCPCS

## 2019-10-22 PROCEDURE — 97140 MANUAL THERAPY 1/> REGIONS: CPT

## 2019-10-30 ENCOUNTER — HOSPITAL ENCOUNTER (OUTPATIENT)
Dept: PHYSICAL THERAPY | Facility: CLINIC | Age: 84
Setting detail: THERAPIES SERIES
Discharge: HOME | End: 2019-10-30
Payer: MEDICARE

## 2019-10-30 PROCEDURE — 97035 APP MDLTY 1+ULTRASOUND EA 15: CPT

## 2019-10-30 PROCEDURE — 97110 THERAPEUTIC EXERCISES: CPT

## 2019-10-30 PROCEDURE — G0283 ELEC STIM OTHER THAN WOUND: HCPCS

## 2019-11-11 ENCOUNTER — HOSPITAL ENCOUNTER (OUTPATIENT)
Dept: PHYSICAL THERAPY | Facility: CLINIC | Age: 84
Setting detail: THERAPIES SERIES
Discharge: HOME | End: 2019-11-11
Payer: MEDICARE

## 2019-11-11 PROCEDURE — 97035 APP MDLTY 1+ULTRASOUND EA 15: CPT

## 2019-11-11 PROCEDURE — G0283 ELEC STIM OTHER THAN WOUND: HCPCS

## 2019-11-18 ENCOUNTER — HOSPITAL ENCOUNTER (OUTPATIENT)
Dept: PHYSICAL THERAPY | Facility: CLINIC | Age: 84
Setting detail: THERAPIES SERIES
Discharge: HOME | End: 2019-11-18
Payer: MEDICARE

## 2019-11-18 PROCEDURE — 97035 APP MDLTY 1+ULTRASOUND EA 15: CPT

## 2019-11-18 PROCEDURE — G0283 ELEC STIM OTHER THAN WOUND: HCPCS

## 2019-11-25 ENCOUNTER — HOSPITAL ENCOUNTER (OUTPATIENT)
Dept: PHYSICAL THERAPY | Facility: CLINIC | Age: 84
Setting detail: THERAPIES SERIES
Discharge: HOME | End: 2019-11-25
Payer: MEDICARE

## 2019-11-25 PROCEDURE — 97035 APP MDLTY 1+ULTRASOUND EA 15: CPT

## 2019-11-25 PROCEDURE — G0283 ELEC STIM OTHER THAN WOUND: HCPCS

## 2019-12-09 ENCOUNTER — HOSPITAL ENCOUNTER (OUTPATIENT)
Dept: PHYSICAL THERAPY | Facility: CLINIC | Age: 84
Setting detail: THERAPIES SERIES
Discharge: HOME | End: 2019-12-09
Payer: MEDICARE

## 2019-12-09 PROCEDURE — G0283 ELEC STIM OTHER THAN WOUND: HCPCS

## 2019-12-09 PROCEDURE — 97035 APP MDLTY 1+ULTRASOUND EA 15: CPT

## 2019-12-23 ENCOUNTER — HOSPITAL ENCOUNTER (OUTPATIENT)
Dept: PHYSICAL THERAPY | Facility: CLINIC | Age: 84
Setting detail: THERAPIES SERIES
Discharge: HOME | End: 2019-12-23
Payer: MEDICARE

## 2019-12-23 PROCEDURE — G0283 ELEC STIM OTHER THAN WOUND: HCPCS

## 2019-12-23 PROCEDURE — 97035 APP MDLTY 1+ULTRASOUND EA 15: CPT

## 2021-05-28 ENCOUNTER — HOSPITAL ENCOUNTER (OUTPATIENT)
Dept: PHYSICAL THERAPY | Facility: CLINIC | Age: 86
Setting detail: THERAPIES SERIES
Discharge: HOME | End: 2021-05-28
Payer: MEDICARE

## 2021-05-28 PROCEDURE — 97140 MANUAL THERAPY 1/> REGIONS: CPT

## 2021-05-28 PROCEDURE — G0283 ELEC STIM OTHER THAN WOUND: HCPCS

## 2021-05-28 PROCEDURE — 97161 PT EVAL LOW COMPLEX 20 MIN: CPT

## 2021-06-02 ENCOUNTER — HOSPITAL ENCOUNTER (OUTPATIENT)
Dept: PHYSICAL THERAPY | Facility: CLINIC | Age: 86
Setting detail: THERAPIES SERIES
Discharge: HOME | End: 2021-06-02
Payer: MEDICARE

## 2021-06-02 PROCEDURE — G0283 ELEC STIM OTHER THAN WOUND: HCPCS

## 2021-06-02 PROCEDURE — 97140 MANUAL THERAPY 1/> REGIONS: CPT

## 2021-06-09 ENCOUNTER — HOSPITAL ENCOUNTER (OUTPATIENT)
Dept: PHYSICAL THERAPY | Facility: CLINIC | Age: 86
Setting detail: THERAPIES SERIES
Discharge: HOME | End: 2021-06-09
Payer: MEDICARE

## 2021-06-09 PROCEDURE — 97110 THERAPEUTIC EXERCISES: CPT

## 2021-06-09 PROCEDURE — G0283 ELEC STIM OTHER THAN WOUND: HCPCS

## 2021-06-09 PROCEDURE — 97140 MANUAL THERAPY 1/> REGIONS: CPT

## 2021-06-16 ENCOUNTER — HOSPITAL ENCOUNTER (OUTPATIENT)
Dept: PHYSICAL THERAPY | Facility: CLINIC | Age: 86
Setting detail: THERAPIES SERIES
Discharge: HOME | End: 2021-06-16
Payer: MEDICARE

## 2021-06-16 NOTE — FLOWSHEET NOTE
[] [facility]  [address]    [phone]  F: [phone]   [] [address]  [facility]   [address]  P: [phone]  F: [phone]  [x] [address] [facility]  [address] [facility]  P: [phone]  F: [phone] [] [address]  P: [phone]  F: [phone]  [] [address]  [address] [facility] [address]   [address]   [place]: [phone]  F: [phone]   [] [facility]  [address] [facility] [address]  [place]: [phone]   F: [phone]     Physical Therapy Cancel/No Show note    Date: 2021  Patient: Beto Neal  : 1935  MRN: [id number]    Cancels/No Shows to date: 1    For today's appointment patient:    []  Cancelled    [x] Rescheduled appointment    [] No-show     Reason given by patient:    []  Patient ill    []  Conflicting appointment    [] No transportation      [] Conflict with work    [] No reason given    [] Weather related    [] [id number]    [] Other:      Comments:        [x] Next appointment was confirmed 21    Electronically signed by: Kylah Trinh PT

## 2021-06-22 ENCOUNTER — HOSPITAL ENCOUNTER (OUTPATIENT)
Dept: PHYSICAL THERAPY | Facility: CLINIC | Age: 86
Setting detail: THERAPIES SERIES
Discharge: HOME | End: 2021-06-22
Payer: MEDICARE

## 2021-06-22 PROCEDURE — 97140 MANUAL THERAPY 1/> REGIONS: CPT

## 2021-06-22 PROCEDURE — G0283 ELEC STIM OTHER THAN WOUND: HCPCS

## 2021-06-22 PROCEDURE — 97110 THERAPEUTIC EXERCISES: CPT

## 2021-07-14 ENCOUNTER — HOSPITAL ENCOUNTER (OUTPATIENT)
Dept: PHYSICAL THERAPY | Facility: CLINIC | Age: 86
Setting detail: THERAPIES SERIES
Discharge: HOME | End: 2021-07-14
Payer: MEDICARE

## 2021-07-14 PROCEDURE — G0283 ELEC STIM OTHER THAN WOUND: HCPCS

## 2021-07-14 PROCEDURE — 97110 THERAPEUTIC EXERCISES: CPT

## 2021-08-06 ENCOUNTER — HOSPITAL ENCOUNTER (OUTPATIENT)
Dept: PHYSICAL THERAPY | Facility: CLINIC | Age: 86
Setting detail: THERAPIES SERIES
Discharge: HOME | End: 2021-08-06
Payer: MEDICARE

## 2021-08-06 PROCEDURE — G0283 ELEC STIM OTHER THAN WOUND: HCPCS

## 2021-08-06 PROCEDURE — 97140 MANUAL THERAPY 1/> REGIONS: CPT

## 2022-08-30 ENCOUNTER — HOSPITAL ENCOUNTER (OUTPATIENT)
Dept: PHYSICAL THERAPY | Facility: CLINIC | Age: 87
Setting detail: THERAPIES SERIES
Discharge: HOME | End: 2022-08-30
Payer: MEDICARE

## 2022-08-30 PROCEDURE — 97161 PT EVAL LOW COMPLEX 20 MIN: CPT

## 2022-08-30 PROCEDURE — 97110 THERAPEUTIC EXERCISES: CPT

## 2022-09-07 ENCOUNTER — HOSPITAL ENCOUNTER (OUTPATIENT)
Dept: PHYSICAL THERAPY | Facility: CLINIC | Age: 87
Setting detail: THERAPIES SERIES
Discharge: HOME | End: 2022-09-07
Payer: MEDICARE

## 2022-09-07 PROCEDURE — 97110 THERAPEUTIC EXERCISES: CPT

## 2022-09-07 NOTE — FLOWSHEET NOTE
[] [address]  [address]  [phone]  F: [phone] [] [address]  [facility] 36   [address]  P: [phone]  F: [phone] [x] [place] &  Therapy  [address] [facility]  P: [phone]  F: [phone] [] [address]  P: [phone]  F: [phone] [] [address]  [facility]   [address]   [place]: [phone]  F: [phone]      Physical Therapy Daily Treatment Note    Date:  2022  Patient Name:  Christian Elliott    :  1935  MRN: [id number]  Physician: [address]: Medicare/AARP (vs per [place])  Medical Diagnosis: Left knee pain                 Rehab Codes: M25.562, M62.81, M25.462  Onset Date: 22                                  Next 's appt: PRN    Visit# / total visits: ; Progress note for Medicare patient due at visit 10     Cancels/No Shows: 0    Subjective:    Pain:  [x] Yes  [] No Location:Left >right knee Pain Rating: (0-10 scale) 3/10  Pain altered Tx:  [x] No  [] Yes  Action:  Comments: Pt states she feel the heel lift is helping. She also had both knees drained again which has helped. She has been working on [employer]. Objective:  Modalities:   Precautions:  Exercises:  Exercise Reps/ Time Weight/ Level Comments   SLR 20x       Sidelying hip abd 20x       Clam shell ex 20x  blue     bridges 20x  blue     Wall sits 10x 20\"       Lateral steps w/resistance 4L blue    Standing Hip Extension with Leg Bent and Support  20x blue    Lat pulldown 20x 10# modified   Deadlift with cable column 20x 20#    Other: Took pt up to Y and instructed in program for HEP.     Treatment Charges: Mins Units   []  Modalities     [x]  Ther Exercise 30 2   []  Manual Therapy     []  Ther Activities     []  Aquatics     []  Vasocompression     []  Other     Total Treatment time 30 2       Assessment: [x] Progressing toward goals. Good benefits from heel lift. Will cont to use. Cont with previous ex with min cues. Took pt upstairs to Y where she normally exercises and instructed her in new exercises per above. Pt would prefer to work on HEP for a few weeks and then report back if needs upgrade or feels this is OK for her. She also cont to ride stationary bike and work on stretches she was already performing. [] No change. [] Other:  [x] Patient would continue to benefit from skilled physical therapy services in order to: increase strength, increase ROM, decrease pain and increase function. STG: (to be met in 6 treatments)  ? Pain:2/10  ? ROM: 10 deg left knee  ? Strength:Cullen LE grossly 5/5  ? Function: Able to perform all basic ADL's without difficulty  Patient to be independent with home exercise program as demonstrated by performance with correct form without cues. LTG: (to be met in 12 treatments)  0/10 pain  LEFS score improved to 20% or less functionally impaired. Using heel lift with good success for leg length to improve Left knee pain/overall function. Patient goals: Strengthen muscles around knees    Pt. Education:  [x] Yes  [] No  [x] Reviewed Prior HEP/Ed  Method of Education: [x] Verbal  [x] Demo  [x] Written (see below)  Comprehension of Education:  [x] Verbalizes understanding. [x] Demonstrates understanding. [] Needs review. [] Demonstrates/verbalizes HEP/Ed previously given. Access Code: [email or web address]  URL: [email or web address]  Date: 09/07/2022  Prepared by: Vaishnavi Grantrs with Resistance - 1 x daily - 3-4 x weekly - 3 sets - 10 reps  Bridge with Abduction and Resistance Loop - 1 x daily - 3-4 x weekly - 3 sets - 10 reps  Sidelying Hip Abduction with Resistance at Thighs - 1 x daily - 3-4 x weekly - 3 sets - 10 reps  Wall Quarter Squat - 1 x daily - 3-4 x weekly - 1 sets - 10 reps - 20-30 hold  Side Stepping with Resistance at Thighs - 1 x daily - 3-4 x weekly - 3 sets - 10 reps  Standing Hip Extension with Leg Bent and Support - 1 x daily - 3-4 x weekly - 3 sets - 10 reps  Staggered Lat Pull Down with Resistance - Elbows Bent - 1 x daily - 3-4 x weekly - 3 sets - 10 reps  Deadlift with Resistance - 1 x daily - 3-4 x weekly - 3 sets - 10 reps    Plan: [x] Continue current frequency toward long and short term goals.     [x] Specific Instructions for subsequent treatments: Pt will work on [employer] for a few weeks and then report back if wishes to be D/C or cont with further updates/adjustments to program.      Time UW:[id number]            Time Out: 0915    Electronically signed by:  Waldo George PT

## 2022-09-29 NOTE — FLOWSHEET NOTE
Ez Fall Risk Assessment    Patient Name:  Amna Delgado  : 1935    Risk Factor Scale  Score   History of Falls [] Yes  [x] No 25  0 0   Secondary Diagnosis [] Yes  [x] No 15  0 0   Ambulatory Aid [] Furniture  [] Crutches/cane/walker  [x] None/bedrest/wheelchair/nurse 30  15  0 0   IV/Heparin Lock [] Yes  [x] No 20  0 0   Gait/Transferring [] Impaired  [x] Weak  [] Normal/bedrest/immobile 20  10  0 10   Mental Status [] Forgets limitations  [x] Oriented to own ability 15  0 0      Total:10     Based on the Assessment score: check the appropriate box.     [x]  No intervention needed   Low =   Score of 0-24    []  Use standard prevention interventions Moderate =  Score of 24-44   [] Give patient handout and discuss fall prevention strategies   [] Establish goal of education for patient/family RE: fall prevention strategies    []  Use high risk prevention interventions High = Score of 45 and higher   [] Give patient handout and discuss fall prevention strategies   [] Establish goal of education for patient/family Re: fall prevention strategies   [] Discuss lifeline / other resources    Electronically signed by:   Rashid Garcia PT  Date: 2022

## 2022-09-29 NOTE — CONSULTS
[] [facility]) - [facility] &  Therapy  [address]  [phone]  F: [phone] [] [address]  [place] [address]   [address]  P: [phone]  F: [phone] [x] [address] &  Therapy  [address]  P: [phone]  F: [phone] [] [address]  P: [phone]  F: [phone] [] [address]  [facility]   [address]   [place]: [phone]  F: [phone]      Physical Therapy General Evaluation    Date:  2022  Patient: Jose Carlos Padilla  : 1935  MRN: [id number]  Physician: Shaila Kruger     Insurance: Medicare/AARP ([place]  Medical Diagnosis: Left knee pain    Rehab Codes: M25.562, M62.81, M25.462  Onset Date: 22                                  Next 's appt: PRN    Subjective:   CC: Pt is an 80 yo female who presents with c/o bilateral knee pain Left>right. Pt has hx of OA with previous Dinora TSA and Left EFFIE. Pt has most difficulty when getting up/down from chairs or is she has to stand for any length of time. States her back also feels week and that she feels like she is more forward bent. Has seen ortho but they do not recommend surgery but to strengthen LEs for better support. Pt also with edema and had dinora knees drained and will have them drained again this week. Does some ex on a regular basis but needs guidance on what she should or should not be doing.    HPI: (onset date: 22)    PMHx: [] Unremarkable [] Diabetes [] HTN  [] Pacemaker   [] MI/Heart Problems [] Cancer [x] Arthritis [] Other:              [x] Refer to full medical chart  In EPIC       Comorbidities:   [] Obesity [] Dialysis  [] N/A   [] Asthma/COPD [] Dementia [] Other:   [] Stroke [] Sleep apnea [] Other:   [] Vascular disease [] Rheumatic disease [] Other:     Tests: [x] X-Ray: OA [] MRI:  [] Other:    Medications: [x] Refer to full medical record [] None [] Other:  Allergies:      [x] Refer to full medical record [] None [] Other:    Function:  Hand Dominance  [] Right  [] Left  Patient lives with: alone   In what type of home [x]  One story   [] Two story   [] Split level   Number of stairs to enter 0   With handrail on the []  Right to enter   [] Left to enter   Bathroom has a []  Tub only  [] Tub/shower combo   [] Walk in shower    []  Grab bars   Washing machine is on [x]  Main level   [] Second level   [] Basement   Employer    Job Status []  Normal duty   [] Light duty   [] Off due to condition    [x]  Retired   [] Not employed   [] Disability  [] Other:  []  Return to work:    Work activities/duties        ADL/IADL [x] Previously independent with all [x] Currently independent with all Who currently assists the patient with task    [] Previously independent with all except: [] Currently independent with all except:    Bathing  [] Assist [] Assist    Dress/grooming [] Assist [] Assist    Transfer/mobility [] Assist [] Assist    Feeding [] Assist [] Assist    Toileting [] Assist [] Assist    Driving [] Assist [] Assist    Housekeeping [] Assist [] Assist    Grocery shop/meal prep [] Assist [] Assist      Gait Prior level of function Current level of function    [x] Independent  [] Assist [x] Independent  [] Assist   Device: [x] Independent [x] Independent    [] Straight Cane [] Quad cane [] Straight Cane [] Quad cane    [] Standard walker [] Rolling walker   [] 4 wheeled walker [] Standard walker [] Rolling walker   [] 4 wheeled walker    [] Wheelchair [] Wheelchair     Pain:  [x] Yes  [] No Location: Left>right knee Pain Rating: (0-10 scale) 4/10  Pain altered Tx:  [] Yes  [x] No  Action:    Symptoms:  [] Improving [] Worsening [x] Same  Better:  [] AM    [] PM    [] Sit    [] Rise/Sit    []Stand    [] Walk    [] Lying    [x] Other:inactivity  Worse: [] AM    [] PM    [] Sit    [] Rise/Sit    []Stand    [] Walk    [] Lying    [] Bend                      [] Valsalva    [x] Other: movement of standing up or sitting down  Sleep: [x] OK    [] Disturbed    Objective:      ROM  ° A/P STRENGTH  ROM    Left Right Left Right Cervical    Shoulder Flex     Flexion    Abd     Extension    Elbow Flex     Rotation L R   Ext     Sidebend L R   Wrist Flex     Retraction    Ext     Lumbar WFL   Hand      Flexion    Hip Flex   4+ 4+ Extension    Ext   4+ 4+ Rotation L  R   Abd   4 4 Sidebend L R   Knee Flex 110 deg 95 deg 4 4+      Ext -10 deg -20 deg 4 4+      Ankle DF   4+ 4+      PF   4+ 4+        OBSERVATION No Deficit Deficit Not Tested Comments   Posture       Forward Head [] [] []    Rounded Shoulders [] [] []    Kyphosis [] [x] [] min   Lordosis [] [] []    Lateral Shift [] [] []    Scoliosis [] [x] []    Iliac Crest [] [x] []    PSIS [] [x] []    ASIS [] [x] []    Genu Valgus [] [] []    Genu Varus [] [] []    Genu Recurvatum [] [] []    Pronation [] [] []    Supination [] [] []    Leg Length Discrp [] [x] [] Right 1/2\"   Slumped Sitting [] [] []    Palpation [] [x] [] Moderate tension left  thigh>right and ITB   Sensation [] [] []    Edema [] [x] [] Mod right ( will have drained this week)   Neurological [] [] []      Functional Test: LEFS Score: 32.5% functionally impaired     Assessment:  Patient would benefit from skilled physical therapy services in order to: increase strength, increase ROM, decrease pain and increase function. Problems:    [x] ? Pain:  [x] ? ROM:  [x] ? Strength:  [x] ? Function:  [] Other:      STG: (to be met in 6 treatments)  ? Pain:2/10  ? ROM: 10 deg left knee  ? Strength:Cullen LE grossly 5/5  ? Function: Able to perform all basic ADL's without difficulty  Patient to be independent with home exercise program as demonstrated by performance with correct form without cues.   LTG: (to be met in 12 treatments)  0/10 pain  LEFS score improved to 20% or less functionally impaired. Using heel lift with good success for leg length to improve Left knee pain/overall function. Patient goals: Strengthen muscles around knees    Rehab Potential:  [x] Good  [] Fair  [] Poor   Suggested Professional Referral:  [x] No  [] Yes:  Barriers to Goal Achievement:  [x] No  [] Yes:  Domestic Concerns:  [x] No  [] Yes:    Pt. Education:  [x] Plans/Goals, Risks/Benefits discussed  [x] Home exercise program  Method of Education: [x] Verbal  [x] Demo  [x] Written( SLR, supine hip abd, bridges, clam shell ex, wall sits)  Comprehension of Education:  [x] Verbalizes understanding. [x] Demonstrates understanding. [] Needs Review. [] Demonstrates/verbalizes understanding of HEP/Ed previously given. Treatment Plan:  [x] Therapeutic Exercise   79288  [] Iontophoresis: 4 mg/mL Dexamethasone Sodium Phosphate  mAmin  36307   [x] Therapeutic Activity  63275 [] Vasopneumatic cold with compression  81382    [] Gait Training   70139 [] Ultrasound   22411   [x] Neuromuscular Re-education  31519 [x] Electrical Stimulation Unattended  01303   [x] Manual Therapy  68874 [] Electrical Stimulation Attended  60178   [x] Instruction in HEP  [] Lumbar/Cervical Traction  77517   [] Aquatic Therapy   14271 [x] Cold/hotpack    [] Massage   55472      [] Dry Needling, 1 or 2 muscles  57497   [] Biofeedback, first 15 minutes   81493  [] Biofeedback, additional 15 minutes   35256 [] Dry Needling, 3 or more muscles  61361     Frequency:  2 x/week for 12 visits    Todays Treatment:  Modalities:   Precautions:   Exercises:  Exercise Reps/ Time Weight/ Level Comments   SLR 20x     Sidelying hip abd 20x     Clam shell ex 20x     bridges 20x     Wall sits 5x 20\"     Other:    Specific Instructions for next treatment:Check heel lift, review ex for HEP, Go upstairs with pt to [facility] gym to instruct in ex for HEP.     Evaluation Complexity:  History (Personal factors, comorbidities) [] 0 [x] 1-2 [] 3+   Exam (limitations, restrictions) [x] 1-2 [] 3 [] 4+   Clinical presentation (progression) [x] Stable [] Evolving  [] Unstable   Decision Making [x] Low [] Moderate [] High    [x] Low Complexity [] Moderate Complexity [] High Complexity       Treatment Charges: Mins Units   [x] Evaluation       [x]  Low       []  Moderate       []  High 20 1   []  Modalities     [x]  Ther Exercise 15 1   []  Manual Therapy     []  Ther Activities     []  Aquatics     []  Vasocompression     []  Other       TOTAL TREATMENT TIME: 35    Time in:1400     Time out:1440    Electronically signed by: Carmen Wilson PT    Physician Signature:________________________________Date:__________________  By signing above or cosigning this note, I have reviewed this plan of care and certify a need for medically necessary rehabilitation services.      *PLEASE SIGN ABOVE AND FAX BACK ALL PAGES*

## 2022-09-29 NOTE — PRE-CERTIFICATION NOTE
Medicare Cap   [x] Physical Therapy  [] Speech Therapy  [] Occupational therapy  *PT and Speech caps combine      $2150 Limit for PT and Speech combined  $2150 Limit for OT individually  At the beginning of the month where you expect to go over $2150, please add the [address] [place] [address] modifier      Patient Name: Annamarie Hutcihson: 1935    Note:  This is an estimate of charges billed.      Date of [address] Name # units/ charge $$$ charge Daily Total Charge Ongoing Total $$$   8/30/22 Justin, EX  97.02+22.29 119.31 119.31

## 2022-09-29 NOTE — PRE-CERTIFICATION NOTE
Medicare Cap   [x] Physical Therapy  [] Speech Therapy  [] Occupational therapy  *PT and Speech caps combine      $2150 Limit for PT and Speech combined  $2150 Limit for OT individually  At the beginning of the month where you expect to go over $2150, please add the [address] [place] [address] modifier      Patient Name: Henrry Epp: 1935    Note:  This is an estimate of charges billed.      Date of [address] Name # units/ charge $$$ charge Daily Total Charge Ongoing Total $$$   8/30/22 Jacques Anderson  97.02+22.29 119.31 119.31   9/7/22 Ex x 2  28.5+22.29 50.79 170.10

## 2022-12-13 ENCOUNTER — HOSPITAL ENCOUNTER (OUTPATIENT)
Dept: PHYSICAL THERAPY | Facility: CLINIC | Age: 87
Setting detail: THERAPIES SERIES
Discharge: HOME | End: 2022-12-13
Payer: MEDICARE

## 2022-12-13 PROCEDURE — 97530 THERAPEUTIC ACTIVITIES: CPT

## 2023-01-20 ENCOUNTER — TRANSCRIBE ORDERS (OUTPATIENT)
Dept: ADMINISTRATIVE | Age: 88
End: 2023-01-20

## 2023-01-20 DIAGNOSIS — M79.89 LEG SWELLING: Primary | ICD-10-CM

## 2023-01-20 DIAGNOSIS — M25.472 LEFT ANKLE SWELLING: ICD-10-CM

## 2023-01-20 DIAGNOSIS — M79.662 PAIN OF LEFT CALF: ICD-10-CM

## 2023-07-25 ENCOUNTER — HOSPITAL ENCOUNTER (OUTPATIENT)
Dept: PHYSICAL THERAPY | Facility: CLINIC | Age: 88
Setting detail: THERAPIES SERIES
Discharge: HOME | End: 2023-07-25
Payer: MEDICARE

## 2023-07-25 PROCEDURE — 97161 PT EVAL LOW COMPLEX 20 MIN: CPT

## 2023-07-25 PROCEDURE — 97110 THERAPEUTIC EXERCISES: CPT

## 2023-07-31 ENCOUNTER — HOSPITAL ENCOUNTER (OUTPATIENT)
Dept: PHYSICAL THERAPY | Facility: CLINIC | Age: 88
Setting detail: THERAPIES SERIES
Discharge: HOME | End: 2023-07-31
Payer: MEDICARE

## 2023-07-31 PROCEDURE — 97530 THERAPEUTIC ACTIVITIES: CPT

## 2023-07-31 PROCEDURE — 97110 THERAPEUTIC EXERCISES: CPT

## 2023-10-02 ENCOUNTER — HOSPITAL ENCOUNTER (OUTPATIENT)
Dept: PHYSICAL THERAPY | Facility: CLINIC | Age: 88
Setting detail: THERAPIES SERIES
Discharge: HOME | End: 2023-10-02
Payer: MEDICARE

## 2023-10-02 PROCEDURE — 97110 THERAPEUTIC EXERCISES: CPT

## 2023-10-02 PROCEDURE — 97164 PT RE-EVAL EST PLAN CARE: CPT

## 2023-10-02 PROCEDURE — 97016 VASOPNEUMATIC DEVICE THERAPY: CPT

## 2023-10-02 NOTE — PRE-CERTIFICATION NOTE
Medicare Cap   [x] Physical Therapy  [] Speech Therapy  [] Occupational therapy  *PT and Speech caps combine      $2230 Limit for PT and Speech combined  $2230 Limit for OT individually  At the beginning of the month where you expect to go over $2230, please add the [facility] modifier      Patient Name: Zee Cody: 1935    Note:  This is an estimate of charges billed.      Date of [facility] Name # units/ charge $$$ charge Daily Total Charge Ongoing Total $$$   7/25/23 Eval, EX PT 95.95+21.75 117.70 117.70   7/31/23 TA, EX PT 35.10+21.75 56.85 174.55   10/2/23 Reeval, EX, Vaso PT 66.24+21.75+8.79 96.78 271.33

## 2023-10-02 NOTE — FLOWSHEET NOTE
[] [address]  [address] [facility] [place] [facility].  [phone]  F: [phone] [] [address] [facility]  [address]   [address]  P: [phone]  F: [phone] [x] [address]  [address] [facility]  P: [phone]  F: [phone] [] [facility] [place]: [phone]  F: [phone] [] [address] [facility]  [address] [facility]   [address]   P: [phone]  F: [phone]  [] [address] [facility].   P: [phone]  F: [phone] [] 205 [facility]  [address]  P: [phone]  F: [phone] [] 224 [facility]  [address] [facility]  [place]: [phone]  F: [phone] [] [address] [facility] [address]  [place]: [phone]  F: [phone]      Physical Therapy Daily Treatment Note    Date:  2023  Patient Name:  Grace Culver    :  1935  MRN: [id number]  Physician: Denilson Garay                                      Insurance: [facility] (vs per [place])  Medical Diagnosis: Pain in right knee M25.561, Left hip pain M25.552, arthritis M19.90, OA of Left hip M16.12, Pain in left knee M25.562                  Rehab Codes: M25.561, M25.552, M25.562, R60.0, M62.81  Onset Date: 7/10/23                                  Next 's appt: 23 Left TKA     Visit# / total visits: ; Progress note for Medicare patient due at visit 10     Cancels/No Shows:     Subjective:    Pain:  [x] Yes  [] No Location: Cullen knee/right hip  Pain Rating: (0-10 scale) 6/10  Pain altered Tx:  [x]

## 2023-10-06 ENCOUNTER — HOSPITAL ENCOUNTER (OUTPATIENT)
Dept: PHYSICAL THERAPY | Facility: CLINIC | Age: 88
Setting detail: THERAPIES SERIES
Discharge: HOME | End: 2023-10-06
Payer: MEDICARE

## 2023-10-06 PROCEDURE — 97110 THERAPEUTIC EXERCISES: CPT

## 2023-10-06 PROCEDURE — 97016 VASOPNEUMATIC DEVICE THERAPY: CPT

## 2023-10-06 NOTE — PRE-CERTIFICATION NOTE
Medicare Cap   [x] Physical Therapy  [] Speech Therapy  [] Occupational therapy  *PT and Speech caps combine      $2230 Limit for PT and Speech combined  $2230 Limit for OT individually  At the beginning of the month where you expect to go over $2230, please add the [facility] modifier      Patient Name: Seth House: 1935    Note:  This is an estimate of charges billed.      Date of 7[facility] Name # units/ charge $$$ charge Daily Total Charge Ongoing Total $$$   7/25/23 Eval, EX PT 95.95+21.75 117.70 117.70   7/31/23 TA, EX PT 35.10+21.75 56.85 174.55   10/2/23 Reeval, EX, Vaso PT 66.24+21.75+8.79 96.78 271.33   10/6/23 EX x 2, vaso PT 28.13+21.75+8.79 58.67 330.00

## 2023-10-09 ENCOUNTER — HOSPITAL ENCOUNTER (OUTPATIENT)
Dept: PHYSICAL THERAPY | Facility: CLINIC | Age: 88
Setting detail: THERAPIES SERIES
Discharge: HOME | End: 2023-10-09
Payer: MEDICARE

## 2023-10-09 PROCEDURE — 97016 VASOPNEUMATIC DEVICE THERAPY: CPT

## 2023-10-09 PROCEDURE — 97110 THERAPEUTIC EXERCISES: CPT

## 2023-10-09 NOTE — PRE-CERTIFICATION NOTE
Medicare Cap   [x] Physical Therapy  [] Speech Therapy  [] Occupational therapy  *PT and Speech caps combine      $2230 Limit for PT and Speech combined  $2230 Limit for OT individually  At the beginning of the month where you expect to go over $2230, please add the [facility] modifier      Patient Name: Genesis Perez: 1935    Note:  This is an estimate of charges billed.      Date of 705 [facility] Name # units/ charge $$$ charge Daily Total Charge Ongoing Total $$$   7/25/23 Justin [place] John PT 95.95+21.75 117.70 117.70   7/31/23 TA, EX PT 35.10+21.75 56.85 174.55   10/2/23 Reeval, EX, Vaso PT 66.24+21.75+8.79 96.78 271.33   10/6/23 EX x 2, vaso PT 28.13+21.75+8.79 58.67 330.00   10/9/23 Ex 2, vaso PTA 24.77+19.15+7.74 51.66 381.66

## 2023-10-11 ENCOUNTER — HOSPITAL ENCOUNTER (OUTPATIENT)
Dept: PHYSICAL THERAPY | Facility: CLINIC | Age: 88
Setting detail: THERAPIES SERIES
Discharge: HOME | End: 2023-10-11
Payer: MEDICARE

## 2023-10-11 PROCEDURE — 97110 THERAPEUTIC EXERCISES: CPT

## 2023-10-11 PROCEDURE — 97016 VASOPNEUMATIC DEVICE THERAPY: CPT

## 2023-10-11 NOTE — PRE-CERTIFICATION NOTE
Medicare Cap   [x] Physical Therapy  [] Speech Therapy  [] Occupational therapy  *PT and Speech caps combine      $2230 Limit for PT and Speech combined  $2230 Limit for OT individually  At the beginning of the month where you expect to go over $2230, please add the [facility] modifier      Patient Name: Kj Main: 1935    Note:  This is an estimate of charges billed.      Date of 7[facility] Name # units/ charge $$$ charge Daily Total Charge Ongoing Total $$$   7/25/23 Eval, EX PT 95.95+21.75 117.70 117.70   7/31/23 TA, EX PT 35.10+21.75 56.85 174.55   10/2/23 Reeval, EX, Vaso PT 66.24+21.75+8.79 96.78 271.33   10/6/23 EX x 2, vaso PT 28.13+21.75+8.79 58.67 330.00   10/9/23 Ex 2, vaso PTA 24.77+19.15+7.74 51.66 381.66   10/11/23 EX x 2, Vaso PT 28.13+21.75+8.79 58.67 440.33

## 2023-10-11 NOTE — FLOWSHEET NOTE
[] [address]  4600 [facility].  [phone]  F: [phone] [] 204 [facility]  [address]   [address]  P: [phone]  F: [phone] [x] [address]  151 [facility]  P: [phone]  F: [phone] [] [place]: [phone]  F: [phone] [] 224 [facility]  [address] [facility]   [address]   P: [phone]  F: [phone]  [] [address] [facility].   P: [phone]  F: [phone] [] 205 [facility]  [address] [place]  P: [phone]  F: [phone] [] 224 [facility]  7[address] [facility]  [place]: [phone]  F: [phone] [] [facility] [address]  [place]: [phone]  F: [phone]      Physical Therapy Daily Treatment Note    Date:  10/11/2023  Patient Name:  Sonia Olsen    :  1935  MRN: [id number]  Physician: Fiorella Albrecht                                      Insurance: [employer] (vs per [place])  Medical Diagnosis: Pain in right knee M25.561, Left hip pain M25.552, arthritis M19.90, OA of Left hip M16.12, Pain in left knee M25.562                  Rehab Codes: M25.561, M25.552, M25.562, R60.0, M62.81  Onset Date: 7/10/23                                  Next 's appt: 10/17/23     Visit# / total visits: ; Progress note for Medicare patient due at visit 12     Cancels/No Shows:     Subjective:    Pain:  [] Yes  [x] No Location: Cullen knee/right hip  Pain Rating: (0-10 scale) 0/10  Pain altered Tx:  [x] No  []

## 2023-10-13 ENCOUNTER — HOSPITAL ENCOUNTER (OUTPATIENT)
Dept: PHYSICAL THERAPY | Facility: CLINIC | Age: 88
Setting detail: THERAPIES SERIES
Discharge: HOME | End: 2023-10-13
Payer: MEDICARE

## 2023-10-13 PROCEDURE — 97110 THERAPEUTIC EXERCISES: CPT

## 2023-10-13 PROCEDURE — 97016 VASOPNEUMATIC DEVICE THERAPY: CPT

## 2023-10-13 NOTE — PRE-CERTIFICATION NOTE
Medicare Cap   [x] Physical Therapy  [] Speech Therapy  [] Occupational therapy  *PT and Speech caps combine      $2230 Limit for PT and Speech combined  $2230 Limit for OT individually  At the beginning of the month where you expect to go over $2230, please add the [facility] modifier      Patient Name: Talisha Tilley: 1935    Note:  This is an estimate of charges billed.      Date of 705 [facility] Name # units/ charge $$$ charge Daily Total Charge Ongoing Total $$$   7/25/23 Justin [place] PT 95.95+21.75 117.70 117.70   7/31/23 TA, EX PT 35.10+21.75 56.85 174.55   10/2/23 Reeval, EX, Vaso PT 66.24+21.75+8.79 96.78 271.33   10/6/23 EX x 2, vaso PT 28.13+21.75+8.79 58.67 330.00   10/9/23 Ex 2, vaso PTA 24.77+19.15+7.74 51.66 381.66   10/11/23 EX x 2, Vaso PT 28.13+21.75+8.79 58.67 440.33   10/13/23 EX x 2, vaso PT 28.13+21.75+8.79 58.67 499.00

## 2023-10-16 ENCOUNTER — HOSPITAL ENCOUNTER (OUTPATIENT)
Dept: PHYSICAL THERAPY | Facility: CLINIC | Age: 88
Setting detail: THERAPIES SERIES
Discharge: HOME | End: 2023-10-16
Payer: MEDICARE

## 2023-10-16 PROCEDURE — 97016 VASOPNEUMATIC DEVICE THERAPY: CPT

## 2023-10-16 PROCEDURE — 97110 THERAPEUTIC EXERCISES: CPT

## 2023-10-18 ENCOUNTER — HOSPITAL ENCOUNTER (OUTPATIENT)
Dept: PHYSICAL THERAPY | Facility: CLINIC | Age: 88
Setting detail: THERAPIES SERIES
Discharge: HOME | End: 2023-10-18
Payer: MEDICARE

## 2023-10-18 PROCEDURE — 97110 THERAPEUTIC EXERCISES: CPT

## 2023-10-18 PROCEDURE — 97016 VASOPNEUMATIC DEVICE THERAPY: CPT

## 2023-10-18 NOTE — FLOWSHEET NOTE
[] [address]  4600 [facility] [place] [facility].  [phone]  F: [phone] [] 204 [facility]  [address]   [address]  P: [phone]  F: [phone] [x] [address]  151 [facility]  P: [phone]  F: [phone] [] [place]: [phone]  F: [phone] [] 224 [facility]  [address] [facility]   [address]   P: [phone]  F: [phone]  [] [address] [facility].   P: [phone]  F: [phone] [] 205 [facility]  [address] [facility] [address]   [address]  P: [phone]  F: [phone] [] 224 [facility]  [address] [facility]  [place]: [phone]  F: [phone] [] [address] [facility] [address]  [place]: [phone]  F: [phone]      Physical Therapy Daily Treatment Note    Date:  10/18/2023  Patient Name:  Kraig Dinero    :  1935  MRN: [id number]  Physician: Karen Lino                                      [place] [employer] [place]  Medical Diagnosis: Pain in right knee M25.561, Left hip pain M25.552, arthritis M19.90, OA of Left hip M16.12, Pain in left knee M25.562                  Rehab Codes: M25.561, M25.552, M25.562, R60.0, M62.81  Onset Date: 7/10/23                                  Next 's appt: 23 Left TKA     Visit# / total visits: ; Progress note for Medicare patient due at visit 19     Cancels/No Shows:     Subjective:    Pain:  [x] Yes  [] No Location: Cullen knee/right hip  Pain Rating: (0-10 scale) 6/10  Pain altered Tx:  [x]

## 2023-10-20 ENCOUNTER — HOSPITAL ENCOUNTER (OUTPATIENT)
Dept: PHYSICAL THERAPY | Facility: CLINIC | Age: 88
Setting detail: THERAPIES SERIES
Discharge: HOME | End: 2023-10-20
Payer: MEDICARE

## 2023-10-20 PROCEDURE — 97110 THERAPEUTIC EXERCISES: CPT

## 2023-10-20 PROCEDURE — 97016 VASOPNEUMATIC DEVICE THERAPY: CPT

## 2023-10-20 NOTE — FLOWSHEET NOTE
[] [address]  4600 [facility].  [phone]  F: [phone] [] 204 [facility]  [address]   [address]  P: [phone]  F: [phone] [x] [address]  151 [facility]  P: [phone]  F: [phone] [] [place]: [phone]  F: [phone] [] 224 [facility]  [address] [facility]   [address]   P: [phone]  F: [phone]  [] [address] [facility].   P: [phone]  F: [phone] [] 205 [facility]  [address]  P: [phone]  F: [phone] [] 224 [facility]  795 [facility]  [place]: [phone]  F: [phone] [] [address]  [place]: [phone]  F: [phone]      Physical Therapy Daily Treatment Note    Date:  10/20/2023  Patient Name:  Norbert Ge    :  1935  MRN: [id number]  Physician: Tanja Roland                                      Insurance: [employer] ([place]  Medical Diagnosis: Pain in right knee M25.561, Left hip pain M25.552, arthritis M19.90, OA of Left hip M16.12, Pain in left knee M25.562                  Rehab Codes: M25.561, M25.552, M25.562, R60.0, M62.81  Onset Date: 7/10/23                                  Next 's appt:      Visit# / total visits: 10/24; Progress note for Medicare patient due at visit 12     Cancels/No Shows:     Subjective:    Pain:  [x] Yes  [] No Location: Cullen knee/right hip  Pain Rating: (0-10 scale) 1-2/10  Pain altered Tx:  [x] No  [] Yes

## 2023-10-20 NOTE — PRE-CERTIFICATION NOTE
Medicare Cap   [x] Physical Therapy  [] Speech Therapy  [] Occupational therapy  *PT and Speech caps combine      $2230 Limit for PT and Speech combined  $2230 Limit for OT individually  At the beginning of the month where you expect to go over $2230, please add the 1[facility] modifier      Patient Name: Eric Bentley: 1935    Note:  This is an estimate of charges billed.      Date of 705 [facility] Name # units/ charge $$$ charge Daily Total Charge Ongoing Total $$$   7/25/23 Justin [place] PT 95.95+21.75 117.70 117.70   7/31/23 TA, EX PT 35.10+21.75 56.85 174.55   10/2/23 Reeval, EX, Vaso PT 66.24+21.75+8.79 96.78 271.33   10/6/23 EX x 2, vaso PT 28.13+21.75+8.79 58.67 330.00   10/9/23 Ex 2, vaso PTA 24.77+19.15+7.74 51.66 381.66   10/11/23 EX x 2, Vaso PT 28.13+21.75+8.79 58.67 440.33   10/13/23 EX x 2, vaso PT 28.13+21.75+8.79 58.67 499.00   10/18  PT  58.67 557.67   10/20 EX 3, vaso PTA 24.77+19.15+19.15+7.74 70.81 628.48

## 2023-10-23 ENCOUNTER — HOSPITAL ENCOUNTER (OUTPATIENT)
Dept: PHYSICAL THERAPY | Facility: CLINIC | Age: 88
Setting detail: THERAPIES SERIES
Discharge: HOME | End: 2023-10-23
Payer: MEDICARE

## 2023-10-23 PROCEDURE — 97110 THERAPEUTIC EXERCISES: CPT

## 2023-10-23 PROCEDURE — 97016 VASOPNEUMATIC DEVICE THERAPY: CPT

## 2023-10-23 NOTE — PRE-CERTIFICATION NOTE
Medicare Cap   [x] Physical Therapy  [] Speech Therapy  [] Occupational therapy  *PT and Speech caps combine      $2230 Limit for PT and Speech combined  $2230 Limit for OT individually  At the beginning of the month where you expect to go over $2230, please add the 1111 [facility] modifier      Patient Name: Shlomo Carter: 1935    Note:  This is an estimate of charges billed.      Date of 705 [facility] Name # units/ charge $$$ charge Daily Total Charge Ongoing Total $$$   7/25/23 Justin [place] PT 95.95+21.75 117.70 117.70   7/31/23 TA, EX PT 35.10+21.75 56.85 174.55   10/2/23 Reeval, EX, Vaso PT 66.24+21.75+8.79 96.78 271.33   10/6/23 EX x 2, vaso PT 28.13+21.75+8.79 58.67 330.00   10/9/23 Ex 2, vaso PTA 24.77+19.15+7.74 51.66 381.66   10/11/23 EX x 2, Vaso PT 28.13+21.75+8.79 58.67 440.33   10/13/23 EX x 2, vaso PT 28.13+21.75+8.79 58.67 499.00   10/18  PT  58.67 557.67   10/20 EX 3, vaso PTA 24.77+19.15+19.15+7.74 70.81 628.48   10/23 EX3, vaso PTA  70.81 699.29

## 2023-10-25 ENCOUNTER — HOSPITAL ENCOUNTER (OUTPATIENT)
Dept: PHYSICAL THERAPY | Facility: CLINIC | Age: 88
Setting detail: THERAPIES SERIES
Discharge: HOME | End: 2023-10-25
Payer: MEDICARE

## 2023-10-25 PROCEDURE — 97016 VASOPNEUMATIC DEVICE THERAPY: CPT

## 2023-10-25 PROCEDURE — 97110 THERAPEUTIC EXERCISES: CPT

## 2023-10-25 NOTE — PRE-CERTIFICATION NOTE
Medicare Cap   [x] Physical Therapy  [] Speech Therapy  [] Occupational therapy  *PT and Speech caps combine      $2230 Limit for PT and Speech combined  $2230 Limit for OT individually  At the beginning of the month where you expect to go over $2230, please add the 1111 [facility] modifier      Patient Name: Gregor Bold: 1935    Note:  This is an estimate of charges billed.      Date of 705 [facility] Name # units/ charge $$$ charge Daily Total Charge Ongoing Total $$$   7/25/23 Justin [place] PT 95.95+21.75 117.70 117.70   7/31/23 TA, EX PT 35.10+21.75 56.85 174.55   10/2/23 Reeval, EX, Vaso PT 66.24+21.75+8.79 96.78 271.33   10/6/23 EX x 2, vaso PT 28.13+21.75+8.79 58.67 330.00   10/9/23 Ex 2, vaso PTA 24.77+19.15+7.74 51.66 381.66   10/11/23 EX x 2, Vaso PT 28.13+21.75+8.79 58.67 440.33   10/13/23 EX x 2, vaso PT 28.13+21.75+8.79 58.67 499.00   10/18  PT  58.67 557.67   10/20 EX 3, vaso PTA 24.77+19.15+19.15+7.74 70.81 628.48   10/23 EX3, vaso PTA  70.81 699.29   10/25 EX3, vaso PTA  70.81 770.10

## 2023-10-27 ENCOUNTER — HOSPITAL ENCOUNTER (OUTPATIENT)
Dept: PHYSICAL THERAPY | Facility: CLINIC | Age: 88
Setting detail: THERAPIES SERIES
Discharge: HOME | End: 2023-10-27
Payer: MEDICARE

## 2023-10-27 PROCEDURE — 97016 VASOPNEUMATIC DEVICE THERAPY: CPT

## 2023-10-27 PROCEDURE — 97110 THERAPEUTIC EXERCISES: CPT

## 2023-10-27 NOTE — PRE-CERTIFICATION NOTE
Medicare Cap   [x] Physical Therapy  [] Speech Therapy  [] Occupational therapy  *PT and Speech caps combine      $2230 Limit for PT and Speech combined  $2230 Limit for OT individually  At the beginning of the month where you expect to go over $2230, please add the 1111 [facility] modifier      Patient Name: Jayant Alexandra: 1935    Note:  This is an estimate of charges billed.      Date of 705 [facility] Name # units/ charge $$$ charge Daily Total Charge Ongoing Total $$$   7/25/23 Justin [place] PT 95.95+21.75 117.70 117.70   7/31/23 TA, EX PT 35.10+21.75 56.85 174.55   10/2/23 Reeval, EX, Vaso PT 66.24+21.75+8.79 96.78 271.33   10/6/23 EX x 2, vaso PT 28.13+21.75+8.79 58.67 330.00   10/9/23 Ex 2, vaso PTA 24.77+19.15+7.74 51.66 381.66   10/11/23 EX x 2, Vaso PT 28.13+21.75+8.79 58.67 440.33   10/13/23 EX x 2, vaso PT 28.13+21.75+8.79 58.67 499.00   10/18  PT  58.67 557.67   10/20 EX 3, vaso PTA 24.77+19.15+19.15+7.74 70.81 628.48   10/23 EX3, vaso PTA  70.81 699.29   10/25 EX3, vaso PTA  70.81 770.10   10/27  EX 3, vaso PT 28.13+21.75+21.75+8.79 80.42 850.52

## 2023-10-30 ENCOUNTER — HOSPITAL ENCOUNTER (OUTPATIENT)
Dept: PHYSICAL THERAPY | Facility: CLINIC | Age: 88
Setting detail: THERAPIES SERIES
Discharge: HOME | End: 2023-10-30
Payer: MEDICARE

## 2023-10-30 PROCEDURE — 97016 VASOPNEUMATIC DEVICE THERAPY: CPT

## 2023-10-30 PROCEDURE — 97110 THERAPEUTIC EXERCISES: CPT

## 2023-11-01 ENCOUNTER — APPOINTMENT (OUTPATIENT)
Dept: PHYSICAL THERAPY | Facility: CLINIC | Age: 88
End: 2023-11-01
Payer: MEDICARE

## 2023-11-03 ENCOUNTER — HOSPITAL ENCOUNTER (OUTPATIENT)
Dept: PHYSICAL THERAPY | Facility: CLINIC | Age: 88
Setting detail: THERAPIES SERIES
Discharge: HOME | End: 2023-11-03
Payer: MEDICARE

## 2023-11-03 PROCEDURE — 97110 THERAPEUTIC EXERCISES: CPT

## 2023-11-03 NOTE — FLOWSHEET NOTE
[] [address]  4600 [facility].  [phone]  F: [phone] [] 204 [facility]  [address]   [address]  P: [phone]  F: [phone] [x] [address]  151 [facility]  P: [phone]  F: [phone] [] [place]: [phone]  F: [phone] [] 224 [facility]  One [facility]   [address]   P: [phone]  F: [phone]  [] 7170 [facility].   P: [phone]  F: [phone] [] 205 [facility]  [address]  P: [phone]  F: [phone] [] 224 [facility]  7[address] [facility]  [place]: [phone]  F: [phone] [] [address] [facility] [address]  [place]: [phone]  F: [phone]      Physical Therapy Daily Treatment Note    Date:  11/3/2023  Patient Name:  Chuy Shaw    :  1935  MRN: [id number]  Physician: Milly Neal                                      Insurance: [facility] (vs [facility])  Medical Diagnosis: Pain in right knee M25.561, Left hip pain M25.552, arthritis M19.90, OA of Left hip M16.12, Pain in left knee M25.562                  Rehab Codes: M25.561, M25.552, M25.562, R60.0, M62.81  Onset Date: 7/10/23                                  Next Dr.'s appt:      Visit# / total visits: ; Progress note for Medicare patient due at discharge or visit 24     Cancels/No Shows:     Subjective:    Pain:  [x] Yes  [] No Location: L knee Pain Rating: (0-10 scale) 3/10  Pain altered Tx:  [x] No  [] Yes

## 2023-11-06 ENCOUNTER — HOSPITAL ENCOUNTER (OUTPATIENT)
Dept: PHYSICAL THERAPY | Facility: CLINIC | Age: 88
Setting detail: THERAPIES SERIES
Discharge: HOME | End: 2023-11-06
Payer: MEDICARE

## 2023-11-06 PROCEDURE — 97110 THERAPEUTIC EXERCISES: CPT

## 2023-11-06 PROCEDURE — 97016 VASOPNEUMATIC DEVICE THERAPY: CPT

## 2023-11-06 NOTE — PRE-CERTIFICATION NOTE
Medicare Cap   [x] Physical Therapy  [] Speech Therapy  [] Occupational therapy  *PT and Speech caps combine      $2230 Limit for PT and Speech combined  $2230 Limit for OT individually  At the beginning of the month where you expect to go over $2230, please add the 1111 [facility] modifier      Patient Name: Barbara Christiansen: 1935    Note:  This is an estimate of charges billed.      Date of 705 [facility] Name # units/ charge $$$ charge Daily Total Charge Ongoing Total $$$   7/25/23 Justin [place] PT 95.95+21.75 117.70 117.70   7/31/23 TA, EX PT 35.10+21.75 56.85 174.55   10/2/23 Reeval, EX, Vaso PT 66.24+21.75+8.79 96.78 271.33   10/6/23 EX x 2, vaso PT 28.13+21.75+8.79 58.67 330.00   10/9/23 Ex 2, vaso PTA 24.77+19.15+7.74 51.66 381.66   10/11/23 EX x 2, Vaso PT 28.13+21.75+8.79 58.67 440.33   10/13/23 EX x 2, vaso PT 28.13+21.75+8.79 58.67 499.00   10/18  PT  58.67 557.67   10/20 EX 3, vaso PTA 24.77+19.15+19.15+7.74 70.81 628.48   10/23 EX3, vaso PTA  70.81 699.29   10/25 EX3, vaso PTA  70.81 770.10   10/27  EX 3, vaso PT 28.13+21.75+21.75+8.79 80.42 850.52   11/3 EX 3, vaso PTA  70.81 921.33   11/6 EX 3, vaso PTA  70.81 992.14

## 2023-11-08 ENCOUNTER — APPOINTMENT (OUTPATIENT)
Dept: PHYSICAL THERAPY | Facility: CLINIC | Age: 88
End: 2023-11-08
Payer: MEDICARE

## 2023-11-10 ENCOUNTER — HOSPITAL ENCOUNTER (OUTPATIENT)
Dept: PHYSICAL THERAPY | Facility: CLINIC | Age: 88
Setting detail: THERAPIES SERIES
Discharge: HOME | End: 2023-11-10
Payer: MEDICARE

## 2023-11-10 PROCEDURE — 97016 VASOPNEUMATIC DEVICE THERAPY: CPT

## 2023-11-10 PROCEDURE — 97110 THERAPEUTIC EXERCISES: CPT

## 2023-11-13 ENCOUNTER — HOSPITAL ENCOUNTER (OUTPATIENT)
Dept: PHYSICAL THERAPY | Facility: CLINIC | Age: 88
Setting detail: THERAPIES SERIES
Discharge: HOME | End: 2023-11-13
Payer: MEDICARE

## 2023-11-13 PROCEDURE — 97016 VASOPNEUMATIC DEVICE THERAPY: CPT

## 2023-11-13 PROCEDURE — 97110 THERAPEUTIC EXERCISES: CPT

## 2023-11-13 NOTE — PRE-CERTIFICATION NOTE
Medicare Cap   [x] Physical Therapy  [] Speech Therapy  [] Occupational therapy  *PT and Speech caps combine      $2230 Limit for PT and Speech combined  $2230 Limit for OT individually  At the beginning of the month where you expect to go over $2230, please add the 1111 [facility] modifier      Patient Name: Martha Schwarz: 1935    Note:  This is an estimate of charges billed.      Date of 705 [facility] Name # units/ charge $$$ charge Daily Total Charge Ongoing Total $$$   7/25/23 Justin [place] PT 95.95+21.75 117.70 117.70   7/31/23 TA, EX PT 35.10+21.75 56.85 174.55   10/2/23 Reeval, EX, Vaso PT 66.24+21.75+8.79 96.78 271.33   10/6/23 EX x 2, vaso PT 28.13+21.75+8.79 58.67 330.00   10/9/23 Ex 2, vaso PTA 24.77+19.15+7.74 51.66 381.66   10/11/23 EX x 2, Vaso PT 28.13+21.75+8.79 58.67 440.33   10/13/23 EX x 2, vaso PT 28.13+21.75+8.79 58.67 499.00   10/18  PT  58.67 557.67   10/20 EX 3, vaso PTA 24.77+19.15+19.15+7.74 70.81 628.48   10/23 EX3, vaso PTA  70.81 699.29   10/25 EX3, vaso PTA  70.81 770.10   10/27  EX 3, vaso PT 28.13+21.75+21.75+8.79 80.42 850.52   11/3 EX 3, vaso PTA  70.81 921.33   11/6 EX 3, vaso PTA  70.81 992.14   11/10 EX 3, vaso PTA  70.81 1062.95   11/13 EX 2, vaso PTA  51.66 1114.61

## 2023-11-13 NOTE — FLOWSHEET NOTE
[] [address]  4600 [facility].  [phone]  F: [phone] [] 204 [facility]  [address]   [address]  P: [phone]  F: [phone] [x] [address]  151 [facility]  P: [phone]  F: [phone] [] [place]: [phone]  F: [phone] [] 224 [facility]  One [facility]   [address]   P: [phone]  F: [phone]  [] [address] [facility].   P: [phone]  F: [phone] [] 205 [facility]  [address] [place]    [address]  P: [phone]  F: [phone] [] 224 [facility]  7[address] [facility]  [place]: [phone]  F: [phone] [] [facility]  [place]: [phone]  F: [phone]      Physical Therapy Daily Treatment Note    Date:  2023  Patient Name:  Russell Groves    :  1935  MRN: [id number]  Physician: Adam Garcia                                      Insurance: [facility] ([place]  Medical Diagnosis: Pain in right knee M25.561, Left hip pain M25.552, arthritis M19.90, OA of Left hip M16.12, Pain in left knee M25.562                  Rehab Codes: M25.561, M25.552, M25.562, R60.0, M62.81  Onset Date: 7/10/23                                  Next 's appt:      Visit# / total visits: ; Progress note for Medicare patient due at discharge or visit 24     Cancels/No Shows:     Subjective:    Pain:  [x] Yes  [] No Location: L knee Pain Rating: (0-10 scale) 1/10  Pain altered Tx:  [x] No  [] Yes Opioid Pregnancy And Lactation Text: These medications can lead to premature delivery and should be avoided during pregnancy. These medications are also present in breast milk in small amounts.

## 2023-11-20 ENCOUNTER — HOSPITAL ENCOUNTER (OUTPATIENT)
Dept: PHYSICAL THERAPY | Facility: CLINIC | Age: 88
Setting detail: THERAPIES SERIES
Discharge: HOME | End: 2023-11-20
Payer: MEDICARE

## 2023-11-20 PROCEDURE — 97110 THERAPEUTIC EXERCISES: CPT

## 2023-11-20 PROCEDURE — 97016 VASOPNEUMATIC DEVICE THERAPY: CPT

## 2023-11-20 NOTE — PRE-CERTIFICATION NOTE
Medicare Cap   [x] Physical Therapy  [] Speech Therapy  [] Occupational therapy  *PT and Speech caps combine      $2230 Limit for PT and Speech combined  $2230 Limit for OT individually  At the beginning of the month where you expect to go over $2230, please add the 1111 [facility] modifier      Patient Name: Diego Samples: 1935    Note:  This is an estimate of charges billed.      Date of 705 [facility] Name # units/ charge $$$ charge Daily Total Charge Ongoing Total $$$   7/25/23 Justin [place] PT 95.95+21.75 117.70 117.70   7/31/23 TA, EX PT 35.10+21.75 56.85 174.55   10/2/23 Reeval, EX, Vaso PT 66.24+21.75+8.79 96.78 271.33   10/6/23 EX x 2, vaso PT 28.13+21.75+8.79 58.67 330.00   10/9/23 Ex 2, vaso PTA 24.77+19.15+7.74 51.66 381.66   10/11/23 EX x 2, Vaso PT 28.13+21.75+8.79 58.67 440.33   10/13/23 EX x 2, vaso PT 28.13+21.75+8.79 58.67 499.00   10/18  PT  58.67 557.67   10/20 EX 3, vaso PTA 24.77+19.15+19.15+7.74 70.81 628.48   10/23 EX3, vaso PTA  70.81 699.29   10/25 EX3, vaso PTA  70.81 770.10   10/27  EX 3, vaso PT 28.13+21.75+21.75+8.79 80.42 850.52   11/3 EX 3, vaso PTA  70.81 921.33   11/6 EX 3, vaso PTA  70.81 992.14   11/10 EX 3, vaso PTA  70.81 1062.95   11/13 EX 2, vaso PTA  51.66 1114.61   11/20 EX 2, vaso   51.66 1166.27

## 2023-12-04 ENCOUNTER — HOSPITAL ENCOUNTER (OUTPATIENT)
Dept: PHYSICAL THERAPY | Facility: CLINIC | Age: 88
Setting detail: THERAPIES SERIES
Discharge: HOME | End: 2023-12-04
Payer: MEDICARE

## 2023-12-04 PROCEDURE — 97110 THERAPEUTIC EXERCISES: CPT

## 2025-04-18 ENCOUNTER — HOSPITAL ENCOUNTER (OUTPATIENT)
Dept: PHYSICAL THERAPY | Facility: CLINIC | Age: 89
Setting detail: THERAPIES SERIES
Discharge: HOME | End: 2025-04-18
Payer: MEDICARE

## 2025-04-18 PROCEDURE — 97161 PT EVAL LOW COMPLEX 20 MIN: CPT

## 2025-04-18 PROCEDURE — 97110 THERAPEUTIC EXERCISES: CPT

## 2025-04-18 NOTE — CONSULTS
[] [facility]  Outpatient Rehabilitation &  Therapy  [address]  [phone]  [phone] [] [facility]  Outpatient Rehabilitation &  Therapy  [address]  P: [phone]  F: [phone] [] [facility]  Outpatient Rehabilitation &  Therapy  [address]  P: [phone]  F: [phone] [] [facility]  Outpatient Rehabilitation &  Therapy  [address]  [phone]  [phone] [] [place]  Outpatient Rehabilitation &  Therapy  [address]   P: [phone]  F: [phone]  [] [facility]  Outpatient Rehabilitation &  Therapy  [address]  P: [phone]  F: [phone] [] [facility]  Outpatient Rehabilitation &  Therapy  [address]  [address]  P: [phone]  F: [phone] [] [facility]  Outpatient Rehabilitation &  Therapy  [address]  P: [phone]  F: [phone] [] [facility]  Outpatient Rehabilitation &  Therapy  [address]  P: [phone]  F: [phone]  [] [facility] Outpatient Rehabilitation &  Therapy  [address]  P: [phone]  F: [phone]     Physical Therapy General Evaluation    Date:  2025  Patient: Chiquita Leyva  : 1935  MRN: [id number]  Physician: ***     Insurance: ***  Medical Diagnosis: ***    Rehab Codes: ***  Onset Date: ***                                  Next 's appt: ***    Subjective:   CC:  HPI: (onset date)    PMHx: [] Unremarkable [] Diabetes [] HTN  [] Pacemaker   [] MI/Heart Problems [] Cancer [] Arthritis [] Other:              [] Refer to full medical chart  In EPIC       Comorbidities:   [] Obesity [] Dialysis  [] N/A   [] Asthma/COPD [] Dementia [] Other:   [] Stroke [] Sleep apnea [] Other:   [] Vascular disease [] Rheumatic disease []

## 2025-07-27 ENCOUNTER — APPOINTMENT (OUTPATIENT)
Dept: GENERAL RADIOLOGY | Age: 89
End: 2025-07-27
Payer: MEDICARE

## 2025-07-27 ENCOUNTER — HOSPITAL ENCOUNTER (EMERGENCY)
Age: 89
Discharge: HOME | End: 2025-07-27
Attending: EMERGENCY MEDICINE
Payer: MEDICARE

## 2025-07-27 VITALS
TEMPERATURE: 97.7 F | HEIGHT: 66 IN | SYSTOLIC BLOOD PRESSURE: 156 MMHG | BODY MASS INDEX: 20.89 KG/M2 | OXYGEN SATURATION: 98 % | WEIGHT: 130 LBS | RESPIRATION RATE: 18 BRPM | DIASTOLIC BLOOD PRESSURE: 79 MMHG | HEART RATE: 77 BPM

## 2025-07-27 DIAGNOSIS — M25.552 LEFT HIP PAIN: Primary | ICD-10-CM

## 2025-07-27 PROCEDURE — 6360000002 HC RX W HCPCS: Performed by: NURSE PRACTITIONER

## 2025-07-27 PROCEDURE — 99284 EMERGENCY DEPT VISIT MOD MDM: CPT

## 2025-07-27 PROCEDURE — 96372 THER/PROPH/DIAG INJ SC/IM: CPT

## 2025-07-27 PROCEDURE — 73502 X-RAY EXAM HIP UNI 2-3 VIEWS: CPT

## 2025-07-27 RX ORDER — KETOROLAC TROMETHAMINE 15 MG/ML
15 INJECTION, SOLUTION INTRAMUSCULAR; INTRAVENOUS ONCE
Status: COMPLETED | OUTPATIENT
Start: 2025-07-27 | End: 2025-07-27

## 2025-07-27 RX ADMIN — KETOROLAC TROMETHAMINE 15 MG: 15 INJECTION, SOLUTION INTRAMUSCULAR; INTRAVENOUS at 15:07

## 2025-07-27 ASSESSMENT — PAIN SCALES - GENERAL
PAINLEVEL_OUTOF10: 5
PAINLEVEL_OUTOF10: 4

## 2025-07-27 ASSESSMENT — PAIN - FUNCTIONAL ASSESSMENT: PAIN_FUNCTIONAL_ASSESSMENT: 0-10

## 2025-07-27 NOTE — ED PROVIDER NOTES
[facility] EMERGENCY DEPARTMENT  Emergency Department Encounter  Mid Level Provider     Pt Name: Chiquita Leyva  MRN: [id number]  Birthdate 1935  Date of evaluation: 7/27/25  PCP:  Mansi Davison MD    CHIEF COMPLAINT       Chief Complaint   Patient presents with    Hip Pain     L hip pain       HISTORY OF PRESENT ILLNESS  (Location/Symptom, Timing/Onset,Context/Setting, Quality, Duration, Modifying Factors, Severity.)      Chiquita Leyva is a 89 y.o. female who presents with left hip pain.  History of hip replacement.  No injury.  She just fell that should be checked.    PAST MEDICAL /SURGICAL / SOCIAL / FAMILY HISTORY      has no past medical history on file.     has no past surgical history on file.    Social History     Socioeconomic History    Marital status:      Spouse name: Not on file    Number of children: Not on file    Years of education: Not on file    Highest education level: Not on file   Occupational History    Not on file   Tobacco Use    Smoking status: Never    Smokeless tobacco: Never   Substance and Sexual Activity    Alcohol use: Yes    Drug use: Not Currently    Sexual activity: Not on file   Other Topics Concern    Not on file   Social History Narrative    Not on file     Social Drivers of Health     Financial Resource Strain: Not on file   Food Insecurity: No Food Insecurity (4/26/2023)    Received from [employer] [employer] [facility]    Hunger Screening     Within the past 12 months we worried whether our food would run out before we got money to buy more.: Never True     Within the past 12 months the food we bought just didn't last and we didn't have money to get more.: Never True   Transportation Needs: Not on file   Physical Activity: Not on file   Stress: Not on file   Social Connections: Not on file   Intimate Partner Violence: Unknown (2/22/2024)    Received from [facility]     Fear of Current or

## 2025-07-27 NOTE — DISCHARGE INSTRUCTIONS
Please call your primary care doctor for a follow up appointment as you may need physical therapy or orthopedic referral.  If symptoms worsen or new concerns develop return to the ED

## 2025-07-27 NOTE — ED PROVIDER NOTES
eMERGENCY dEPARTMENT  Attending Physician Attestation     Pt Name: Chiquita Leyva  MRN: [id number]  Birthdate 1935  Date of evaluation: 7/27/25     Chiquita Leyva is a 89 y.o. female with CC: Hip Pain (L hip pain)      I was available to render services if needed but did not directly participate in the care of the patient.    Vitals Reviewed:    Vitals:    07/27/25 1314   BP: (!) 156/79   Pulse: 77   Resp: 18   Temp: 97.7 °F (36.5 °C)   SpO2: 98%   Weight: 59 kg (130 lb)   Height: 1.676 m (5' 6\")       Initial Pain Score: Pain Level: 5 (07/27/25 1314)  Last Pain Score: Pain Level: 5 (07/27/25 1314)    Aleksandr Ocampo MD  Attending Emergency Physician                Aleksandr Ocampo MD  07/27/25 1449       Aleksandr Ocampo MD  07/27/25 1443

## 2025-08-04 ENCOUNTER — HOSPITAL ENCOUNTER (OUTPATIENT)
Dept: PHYSICAL THERAPY | Facility: CLINIC | Age: 89
Setting detail: THERAPIES SERIES
Discharge: HOME | End: 2025-08-04
Payer: MEDICARE

## 2025-08-04 PROCEDURE — G0283 ELEC STIM OTHER THAN WOUND: HCPCS

## 2025-08-04 PROCEDURE — 97164 PT RE-EVAL EST PLAN CARE: CPT

## 2025-08-04 PROCEDURE — 97110 THERAPEUTIC EXERCISES: CPT

## 2025-08-05 ENCOUNTER — HOSPITAL ENCOUNTER (OUTPATIENT)
Dept: PHYSICAL THERAPY | Facility: CLINIC | Age: 89
Setting detail: THERAPIES SERIES
Discharge: HOME | End: 2025-08-05
Payer: MEDICARE

## 2025-08-05 PROCEDURE — 97110 THERAPEUTIC EXERCISES: CPT

## 2025-08-05 PROCEDURE — G0283 ELEC STIM OTHER THAN WOUND: HCPCS

## 2025-08-05 PROCEDURE — 97140 MANUAL THERAPY 1/> REGIONS: CPT

## 2025-08-07 ENCOUNTER — HOSPITAL ENCOUNTER (OUTPATIENT)
Dept: PHYSICAL THERAPY | Facility: CLINIC | Age: 89
Setting detail: THERAPIES SERIES
Discharge: HOME | End: 2025-08-07
Payer: MEDICARE

## 2025-08-07 PROCEDURE — 97110 THERAPEUTIC EXERCISES: CPT

## 2025-08-07 PROCEDURE — G0283 ELEC STIM OTHER THAN WOUND: HCPCS

## 2025-08-07 PROCEDURE — 97035 APP MDLTY 1+ULTRASOUND EA 15: CPT
